# Patient Record
Sex: MALE | Race: WHITE | Employment: OTHER | URBAN - METROPOLITAN AREA
[De-identification: names, ages, dates, MRNs, and addresses within clinical notes are randomized per-mention and may not be internally consistent; named-entity substitution may affect disease eponyms.]

---

## 2023-10-27 PROCEDURE — 99285 EMERGENCY DEPT VISIT HI MDM: CPT

## 2023-10-28 ENCOUNTER — HOSPITAL ENCOUNTER (EMERGENCY)
Facility: HOSPITAL | Age: 68
Discharge: HOME | End: 2023-10-28
Payer: MEDICARE

## 2023-10-28 ENCOUNTER — APPOINTMENT (OUTPATIENT)
Facility: HOSPITAL | Age: 68
End: 2023-10-28
Payer: MEDICARE

## 2023-10-28 VITALS
HEART RATE: 88 BPM | WEIGHT: 111.77 LBS | OXYGEN SATURATION: 100 % | HEIGHT: 67 IN | RESPIRATION RATE: 17 BRPM | DIASTOLIC BLOOD PRESSURE: 94 MMHG | TEMPERATURE: 98.6 F | BODY MASS INDEX: 17.54 KG/M2 | SYSTOLIC BLOOD PRESSURE: 141 MMHG

## 2023-10-28 DIAGNOSIS — R07.89 ATYPICAL CHEST PAIN: Primary | ICD-10-CM

## 2023-10-28 LAB
ALBUMIN SERPL-MCNC: 2.9 G/DL (ref 3.5–5)
ALBUMIN/GLOB SERPL: 0.5 (ref 1.1–2.2)
ALP SERPL-CCNC: 120 U/L (ref 45–117)
ALT SERPL-CCNC: 25 U/L (ref 12–78)
ANION GAP SERPL CALC-SCNC: 6 MMOL/L (ref 5–15)
AST SERPL-CCNC: 19 U/L (ref 15–37)
BASOPHILS # BLD: 0.1 K/UL (ref 0–0.1)
BASOPHILS NFR BLD: 1 % (ref 0–1)
BILIRUB SERPL-MCNC: 0.5 MG/DL (ref 0.2–1)
BUN SERPL-MCNC: 18 MG/DL (ref 6–20)
BUN/CREAT SERPL: 26 (ref 12–20)
CALCIUM SERPL-MCNC: 8.7 MG/DL (ref 8.5–10.1)
CHLORIDE SERPL-SCNC: 103 MMOL/L (ref 97–108)
CO2 SERPL-SCNC: 28 MMOL/L (ref 21–32)
CREAT SERPL-MCNC: 0.68 MG/DL (ref 0.7–1.3)
DIFFERENTIAL METHOD BLD: ABNORMAL
EKG ATRIAL RATE: 78 BPM
EKG DIAGNOSIS: NORMAL
EKG P AXIS: 46 DEGREES
EKG P-R INTERVAL: 140 MS
EKG Q-T INTERVAL: 378 MS
EKG QRS DURATION: 86 MS
EKG QTC CALCULATION (BAZETT): 430 MS
EKG R AXIS: 10 DEGREES
EKG T AXIS: 61 DEGREES
EKG VENTRICULAR RATE: 78 BPM
EOSINOPHIL # BLD: 0.5 K/UL (ref 0–0.4)
EOSINOPHIL NFR BLD: 7 % (ref 0–7)
ERYTHROCYTE [DISTWIDTH] IN BLOOD BY AUTOMATED COUNT: 15.9 % (ref 11.5–14.5)
GLOBULIN SER CALC-MCNC: 5.3 G/DL (ref 2–4)
GLUCOSE SERPL-MCNC: 89 MG/DL (ref 65–100)
HCT VFR BLD AUTO: 32.4 % (ref 36.6–50.3)
HGB BLD-MCNC: 10.7 G/DL (ref 12.1–17)
IMM GRANULOCYTES # BLD AUTO: 0.1 K/UL (ref 0–0.04)
IMM GRANULOCYTES NFR BLD AUTO: 1 % (ref 0–0.5)
LYMPHOCYTES # BLD: 2.2 K/UL (ref 0.8–3.5)
LYMPHOCYTES NFR BLD: 32 % (ref 12–49)
MCH RBC QN AUTO: 26.7 PG (ref 26–34)
MCHC RBC AUTO-ENTMCNC: 33 G/DL (ref 30–36.5)
MCV RBC AUTO: 80.8 FL (ref 80–99)
MONOCYTES # BLD: 0.8 K/UL (ref 0–1)
MONOCYTES NFR BLD: 12 % (ref 5–13)
NEUTS SEG # BLD: 3.2 K/UL (ref 1.8–8)
NEUTS SEG NFR BLD: 47 % (ref 32–75)
NRBC # BLD: 0 K/UL (ref 0–0.01)
NRBC BLD-RTO: 0 PER 100 WBC
PLATELET # BLD AUTO: 215 K/UL (ref 150–400)
PMV BLD AUTO: 10.3 FL (ref 8.9–12.9)
POTASSIUM SERPL-SCNC: 4.2 MMOL/L (ref 3.5–5.1)
PROT SERPL-MCNC: 8.2 G/DL (ref 6.4–8.2)
RBC # BLD AUTO: 4.01 M/UL (ref 4.1–5.7)
SODIUM SERPL-SCNC: 137 MMOL/L (ref 136–145)
TROPONIN I SERPL HS-MCNC: 7 NG/L (ref 0–76)
TROPONIN I SERPL HS-MCNC: 7 NG/L (ref 0–76)
WBC # BLD AUTO: 6.9 K/UL (ref 4.1–11.1)

## 2023-10-28 PROCEDURE — 80053 COMPREHEN METABOLIC PANEL: CPT

## 2023-10-28 PROCEDURE — 84484 ASSAY OF TROPONIN QUANT: CPT

## 2023-10-28 PROCEDURE — 85025 COMPLETE CBC W/AUTO DIFF WBC: CPT

## 2023-10-28 PROCEDURE — 36415 COLL VENOUS BLD VENIPUNCTURE: CPT

## 2023-10-28 PROCEDURE — 71045 X-RAY EXAM CHEST 1 VIEW: CPT

## 2023-10-28 PROCEDURE — 93005 ELECTROCARDIOGRAM TRACING: CPT

## 2023-10-28 ASSESSMENT — PATIENT HEALTH QUESTIONNAIRE - PHQ9
SUM OF ALL RESPONSES TO PHQ QUESTIONS 1-9: 0
SUM OF ALL RESPONSES TO PHQ9 QUESTIONS 1 & 2: 0
SUM OF ALL RESPONSES TO PHQ QUESTIONS 1-9: 0
SUM OF ALL RESPONSES TO PHQ QUESTIONS 1-9: 0
2. FEELING DOWN, DEPRESSED OR HOPELESS: 0
SUM OF ALL RESPONSES TO PHQ QUESTIONS 1-9: 0
1. LITTLE INTEREST OR PLEASURE IN DOING THINGS: 0

## 2023-10-28 ASSESSMENT — PAIN - FUNCTIONAL ASSESSMENT
PAIN_FUNCTIONAL_ASSESSMENT: NONE - DENIES PAIN
PAIN_FUNCTIONAL_ASSESSMENT: NONE - DENIES PAIN

## 2023-10-28 ASSESSMENT — LIFESTYLE VARIABLES
HOW MANY STANDARD DRINKS CONTAINING ALCOHOL DO YOU HAVE ON A TYPICAL DAY: 1 OR 2
HOW OFTEN DO YOU HAVE A DRINK CONTAINING ALCOHOL: MONTHLY OR LESS

## 2023-10-28 ASSESSMENT — HEART SCORE: ECG: 0

## 2023-10-28 NOTE — ED NOTES
Called [facility] and gave return to facility report. Advised on ETA of transport. All facility questions answered.       Mee Fermin RN  10/28/23 4199

## 2023-10-28 NOTE — ED PROVIDER NOTES
64-year-old -American male lying in bed in no acute distress. Patient appears older than stated age. HENT:      Head: Normocephalic and atraumatic. Cardiovascular:      Rate and Rhythm: Normal rate and regular rhythm. Pulses: Normal pulses. Heart sounds: Normal heart sounds. Pulmonary:      Effort: Pulmonary effort is normal. No respiratory distress. Breath sounds: Normal breath sounds. No wheezing. Abdominal:      General: Abdomen is flat. Palpations: Abdomen is soft. Tenderness: There is no abdominal tenderness. Musculoskeletal:      Right Lower Extremity: Right leg is amputated below knee. Skin:     General: Skin is warm and dry. Neurological:      General: No focal deficit present. Mental Status: He is alert and oriented to person, place, and time.    Psychiatric:         Mood and Affect: Mood normal.         Behavior: Behavior normal.          DIAGNOSTIC RESULTS   LABS:     Recent Results (from the past 12 hour(s))   CBC with Auto Differential    Collection Time: 10/28/23  1:00 AM   Result Value Ref Range    WBC 6.9 4.1 - 11.1 K/uL    RBC 4.01 (L) 4.10 - 5.70 M/uL    Hemoglobin 10.7 (L) 12.1 - 17.0 g/dL    Hematocrit 32.4 (L) 36.6 - 50.3 %    MCV 80.8 80.0 - 99.0 FL    MCH 26.7 26.0 - 34.0 PG    MCHC 33.0 30.0 - 36.5 g/dL    RDW 15.9 (H) 11.5 - 14.5 %    Platelets 477 833 - 280 K/uL    MPV 10.3 8.9 - 12.9 FL    Nucleated RBCs 0.0 0  WBC    nRBC 0.00 0.00 - 0.01 K/uL    Neutrophils % 47 32 - 75 %    Lymphocytes % 32 12 - 49 %    Monocytes % 12 5 - 13 %    Eosinophils % 7 0 - 7 %    Basophils % 1 0 - 1 %    Immature Granulocytes 1 (H) 0.0 - 0.5 %    Neutrophils Absolute 3.2 1.8 - 8.0 K/UL    Lymphocytes Absolute 2.2 0.8 - 3.5 K/UL    Monocytes Absolute 0.8 0.0 - 1.0 K/UL    Eosinophils Absolute 0.5 (H) 0.0 - 0.4 K/UL    Basophils Absolute 0.1 0.0 - 0.1 K/UL    Absolute Immature Granulocyte 0.1 (H) 0.00 - 0.04 K/UL    Differential Type AUTOMATED

## 2023-11-01 ENCOUNTER — HOSPITAL ENCOUNTER (INPATIENT)
Facility: HOSPITAL | Age: 68
LOS: 6 days | Discharge: NURSING, REHAB OR LONG-TERM CARE | DRG: 391 | End: 2023-11-07
Attending: EMERGENCY MEDICINE | Admitting: STUDENT IN AN ORGANIZED HEALTH CARE EDUCATION/TRAINING PROGRAM
Payer: MEDICARE

## 2023-11-01 DIAGNOSIS — R13.19 ESOPHAGEAL DYSPHAGIA: Primary | ICD-10-CM

## 2023-11-01 PROBLEM — R13.10 DYSPHAGIA: Status: ACTIVE | Noted: 2023-11-01

## 2023-11-01 LAB
ALBUMIN SERPL-MCNC: 3.5 G/DL (ref 3.5–5)
ALBUMIN/GLOB SERPL: 0.5 (ref 1.1–2.2)
ALP SERPL-CCNC: 143 U/L (ref 45–117)
ALT SERPL-CCNC: 27 U/L (ref 12–78)
AMORPH CRY URNS QL MICRO: ABNORMAL
ANION GAP SERPL CALC-SCNC: 8 MMOL/L (ref 5–15)
APPEARANCE UR: ABNORMAL
AST SERPL-CCNC: 19 U/L (ref 15–37)
BACTERIA URNS QL MICRO: ABNORMAL /HPF
BASOPHILS # BLD: 0.1 K/UL (ref 0–0.1)
BASOPHILS NFR BLD: 1 % (ref 0–1)
BILIRUB SERPL-MCNC: 0.5 MG/DL (ref 0.2–1)
BILIRUB UR QL: NEGATIVE
BUN SERPL-MCNC: 20 MG/DL (ref 6–20)
BUN/CREAT SERPL: 24 (ref 12–20)
CALCIUM SERPL-MCNC: 9.9 MG/DL (ref 8.5–10.1)
CAOX CRY URNS QL MICRO: ABNORMAL
CHLORIDE SERPL-SCNC: 110 MMOL/L (ref 97–108)
CO2 SERPL-SCNC: 26 MMOL/L (ref 21–32)
COLOR UR: ABNORMAL
CREAT SERPL-MCNC: 0.85 MG/DL (ref 0.7–1.3)
DIFFERENTIAL METHOD BLD: ABNORMAL
EOSINOPHIL # BLD: 0.2 K/UL (ref 0–0.4)
EOSINOPHIL NFR BLD: 3 % (ref 0–7)
EPITH CASTS URNS QL MICRO: ABNORMAL /LPF
ERYTHROCYTE [DISTWIDTH] IN BLOOD BY AUTOMATED COUNT: 16 % (ref 11.5–14.5)
GLOBULIN SER CALC-MCNC: 7.1 G/DL (ref 2–4)
GLUCOSE SERPL-MCNC: 73 MG/DL (ref 65–100)
GLUCOSE UR STRIP.AUTO-MCNC: NEGATIVE MG/DL
HCT VFR BLD AUTO: 40.7 % (ref 36.6–50.3)
HGB BLD-MCNC: 13.1 G/DL (ref 12.1–17)
HGB UR QL STRIP: ABNORMAL
IMM GRANULOCYTES # BLD AUTO: 0 K/UL (ref 0–0.04)
IMM GRANULOCYTES NFR BLD AUTO: 0 % (ref 0–0.5)
KETONES UR QL STRIP.AUTO: 15 MG/DL
LEUKOCYTE ESTERASE UR QL STRIP.AUTO: ABNORMAL
LIPASE SERPL-CCNC: 18 U/L (ref 13–75)
LYMPHOCYTES # BLD: 1.1 K/UL (ref 0.8–3.5)
LYMPHOCYTES NFR BLD: 19 % (ref 12–49)
MCH RBC QN AUTO: 26.8 PG (ref 26–34)
MCHC RBC AUTO-ENTMCNC: 32.2 G/DL (ref 30–36.5)
MCV RBC AUTO: 83.2 FL (ref 80–99)
MONOCYTES # BLD: 0.5 K/UL (ref 0–1)
MONOCYTES NFR BLD: 8 % (ref 5–13)
NEUTS SEG # BLD: 4.2 K/UL (ref 1.8–8)
NEUTS SEG NFR BLD: 69 % (ref 32–75)
NITRITE UR QL STRIP.AUTO: NEGATIVE
NRBC # BLD: 0 K/UL (ref 0–0.01)
NRBC BLD-RTO: 0 PER 100 WBC
PH UR STRIP: 7.5 (ref 5–8)
PLATELET # BLD AUTO: 297 K/UL (ref 150–400)
PMV BLD AUTO: 11 FL (ref 8.9–12.9)
POTASSIUM SERPL-SCNC: 4 MMOL/L (ref 3.5–5.1)
PROT SERPL-MCNC: 10.6 G/DL (ref 6.4–8.2)
PROT UR STRIP-MCNC: 100 MG/DL
RBC # BLD AUTO: 4.89 M/UL (ref 4.1–5.7)
RBC #/AREA URNS HPF: ABNORMAL /HPF (ref 0–5)
SODIUM SERPL-SCNC: 144 MMOL/L (ref 136–145)
SP GR UR REFRACTOMETRY: 1.01
URINE CULTURE IF INDICATED: ABNORMAL
UROBILINOGEN UR QL STRIP.AUTO: 1 EU/DL (ref 0.2–1)
WBC # BLD AUTO: 6.1 K/UL (ref 4.1–11.1)
WBC URNS QL MICRO: ABNORMAL /HPF (ref 0–4)

## 2023-11-01 PROCEDURE — 87077 CULTURE AEROBIC IDENTIFY: CPT

## 2023-11-01 PROCEDURE — 1100000000 HC RM PRIVATE

## 2023-11-01 PROCEDURE — 2580000003 HC RX 258: Performed by: EMERGENCY MEDICINE

## 2023-11-01 PROCEDURE — 80053 COMPREHEN METABOLIC PANEL: CPT

## 2023-11-01 PROCEDURE — 36415 COLL VENOUS BLD VENIPUNCTURE: CPT

## 2023-11-01 PROCEDURE — 87086 URINE CULTURE/COLONY COUNT: CPT

## 2023-11-01 PROCEDURE — 99285 EMERGENCY DEPT VISIT HI MDM: CPT

## 2023-11-01 PROCEDURE — 6360000002 HC RX W HCPCS: Performed by: EMERGENCY MEDICINE

## 2023-11-01 PROCEDURE — 85025 COMPLETE CBC W/AUTO DIFF WBC: CPT

## 2023-11-01 PROCEDURE — 87186 SC STD MICRODIL/AGAR DIL: CPT

## 2023-11-01 PROCEDURE — 93005 ELECTROCARDIOGRAM TRACING: CPT | Performed by: STUDENT IN AN ORGANIZED HEALTH CARE EDUCATION/TRAINING PROGRAM

## 2023-11-01 PROCEDURE — 6370000000 HC RX 637 (ALT 250 FOR IP): Performed by: EMERGENCY MEDICINE

## 2023-11-01 PROCEDURE — 2580000003 HC RX 258: Performed by: NURSE PRACTITIONER

## 2023-11-01 PROCEDURE — 81001 URINALYSIS AUTO W/SCOPE: CPT

## 2023-11-01 PROCEDURE — 96374 THER/PROPH/DIAG INJ IV PUSH: CPT

## 2023-11-01 PROCEDURE — 83690 ASSAY OF LIPASE: CPT

## 2023-11-01 RX ORDER — ACETAMINOPHEN 650 MG/1
650 SUPPOSITORY RECTAL EVERY 6 HOURS PRN
Status: DISCONTINUED | OUTPATIENT
Start: 2023-11-01 | End: 2023-11-07 | Stop reason: HOSPADM

## 2023-11-01 RX ORDER — ONDANSETRON 4 MG/1
4 TABLET, ORALLY DISINTEGRATING ORAL EVERY 8 HOURS PRN
Status: DISCONTINUED | OUTPATIENT
Start: 2023-11-01 | End: 2023-11-07 | Stop reason: HOSPADM

## 2023-11-01 RX ORDER — ONDANSETRON 2 MG/ML
4 INJECTION INTRAMUSCULAR; INTRAVENOUS EVERY 6 HOURS PRN
Status: DISCONTINUED | OUTPATIENT
Start: 2023-11-01 | End: 2023-11-07 | Stop reason: HOSPADM

## 2023-11-01 RX ORDER — POTASSIUM CHLORIDE 7.45 MG/ML
10 INJECTION INTRAVENOUS PRN
Status: DISCONTINUED | OUTPATIENT
Start: 2023-11-01 | End: 2023-11-07 | Stop reason: HOSPADM

## 2023-11-01 RX ORDER — SODIUM CHLORIDE 0.9 % (FLUSH) 0.9 %
5-40 SYRINGE (ML) INJECTION PRN
Status: DISCONTINUED | OUTPATIENT
Start: 2023-11-01 | End: 2023-11-07 | Stop reason: HOSPADM

## 2023-11-01 RX ORDER — POTASSIUM CHLORIDE 20 MEQ/1
40 TABLET, EXTENDED RELEASE ORAL PRN
Status: DISCONTINUED | OUTPATIENT
Start: 2023-11-01 | End: 2023-11-07 | Stop reason: HOSPADM

## 2023-11-01 RX ORDER — POLYETHYLENE GLYCOL 3350 17 G/17G
17 POWDER, FOR SOLUTION ORAL DAILY PRN
Status: DISCONTINUED | OUTPATIENT
Start: 2023-11-01 | End: 2023-11-02

## 2023-11-01 RX ORDER — SODIUM CHLORIDE 0.9 % (FLUSH) 0.9 %
5-40 SYRINGE (ML) INJECTION EVERY 12 HOURS SCHEDULED
Status: DISCONTINUED | OUTPATIENT
Start: 2023-11-01 | End: 2023-11-07 | Stop reason: HOSPADM

## 2023-11-01 RX ORDER — SODIUM CHLORIDE, SODIUM LACTATE, POTASSIUM CHLORIDE, AND CALCIUM CHLORIDE .6; .31; .03; .02 G/100ML; G/100ML; G/100ML; G/100ML
1000 INJECTION, SOLUTION INTRAVENOUS
Status: COMPLETED | OUTPATIENT
Start: 2023-11-01 | End: 2023-11-01

## 2023-11-01 RX ORDER — SODIUM CHLORIDE 9 MG/ML
INJECTION, SOLUTION INTRAVENOUS PRN
Status: DISCONTINUED | OUTPATIENT
Start: 2023-11-01 | End: 2023-11-07 | Stop reason: HOSPADM

## 2023-11-01 RX ORDER — SODIUM CHLORIDE 9 MG/ML
INJECTION, SOLUTION INTRAVENOUS CONTINUOUS
Status: DISCONTINUED | OUTPATIENT
Start: 2023-11-01 | End: 2023-11-02

## 2023-11-01 RX ORDER — MAGNESIUM SULFATE IN WATER 40 MG/ML
2000 INJECTION, SOLUTION INTRAVENOUS PRN
Status: DISCONTINUED | OUTPATIENT
Start: 2023-11-01 | End: 2023-11-07 | Stop reason: HOSPADM

## 2023-11-01 RX ORDER — ACETAMINOPHEN 325 MG/1
650 TABLET ORAL EVERY 6 HOURS PRN
Status: DISCONTINUED | OUTPATIENT
Start: 2023-11-01 | End: 2023-11-07 | Stop reason: HOSPADM

## 2023-11-01 RX ORDER — ENOXAPARIN SODIUM 100 MG/ML
30 INJECTION SUBCUTANEOUS DAILY
Status: DISCONTINUED | OUTPATIENT
Start: 2023-11-02 | End: 2023-11-07 | Stop reason: HOSPADM

## 2023-11-01 RX ADMIN — SODIUM CHLORIDE, POTASSIUM CHLORIDE, SODIUM LACTATE AND CALCIUM CHLORIDE 1000 ML: 600; 310; 30; 20 INJECTION, SOLUTION INTRAVENOUS at 20:52

## 2023-11-01 RX ADMIN — GLUCAGON 2 MG: KIT at 20:24

## 2023-11-01 RX ADMIN — ANTACID/ANTIFLATULENT 1 EACH: 380; 550; 10; 10 GRANULE, EFFERVESCENT ORAL at 20:12

## 2023-11-01 RX ADMIN — SODIUM CHLORIDE: 9 INJECTION, SOLUTION INTRAVENOUS at 22:25

## 2023-11-01 ASSESSMENT — LIFESTYLE VARIABLES
HOW MANY STANDARD DRINKS CONTAINING ALCOHOL DO YOU HAVE ON A TYPICAL DAY: PATIENT DOES NOT DRINK
HOW OFTEN DO YOU HAVE A DRINK CONTAINING ALCOHOL: NEVER

## 2023-11-01 ASSESSMENT — ENCOUNTER SYMPTOMS
GASTROINTESTINAL NEGATIVE: 1
RESPIRATORY NEGATIVE: 1
ALLERGIC/IMMUNOLOGIC NEGATIVE: 1
EYES NEGATIVE: 1

## 2023-11-01 ASSESSMENT — PAIN - FUNCTIONAL ASSESSMENT: PAIN_FUNCTIONAL_ASSESSMENT: NONE - DENIES PAIN

## 2023-11-01 NOTE — ED PROVIDER NOTES
[facility] EMERGENCY DEPT  EMERGENCY DEPARTMENT ENCOUNTER       Pt Name: Eduardo Pena  MRN: [id number]  [address] [facility] 1955  Date of evaluation: 11/1/2023  Provider: Soas Davis MD   PCP: No primary care provider on file. Note Started: 7:17 PM EDT 11/1/23     CHIEF COMPLAINT       Chief Complaint   Patient presents with    Abdominal Pain        HISTORY OF PRESENT ILLNESS: 1 or more elements      History From: patient, History limited by: none     Eduardo Pena is a 77 y.o. male with a history of a right leg BKA, urostomy who presents to the emergency department with a chief complaint of \"spitting up. \"    Patient reports that over the past 24 hours he has noted that he will drink water and eat things that will \"get caught in his chest.\"  He reports that he will then spit these out. He denies any nausea. Denies chest pain or shortness of breath. Denies abdominal pain. Denies diarrhea. Patient last attempted to drink a \"cold pepsi\" and reports he felt it get stuck in his chest and then \"come back up. \"    Please See MDM for Additional Details of the HPI/PMH  Nursing Notes were all reviewed and agreed with or any disagreements were addressed in the HPI. REVIEW OF SYSTEMS        Positives and Pertinent negatives as per HPI. PAST HISTORY     Past Medical History:  No past medical history on file. Past Surgical History:  No past surgical history on file. Family History:  No family history on file. Social History: Allergies: Allergies   Allergen Reactions    Iodinated Contrast Media Shortness Of Breath and Myalgia     received contrast dye for CT IVP in October 2017 at OSH- Mild shortness of breath not requiring oxygen without any concern for airway, no rashes. Reported vague back spasms that quickly resolved.  Please see care everywhere for more information    Amlodipine Swelling     Seems to have contributed to significant LE edema (improved in 2/2019 after stopping Amlodipine)    Contrast

## 2023-11-01 NOTE — ED TRIAGE NOTES
[facility] [place] [facility] 1 day. Received zofran by facility.  Pt vomited in route to hospital.

## 2023-11-02 ENCOUNTER — ANESTHESIA (OUTPATIENT)
Facility: HOSPITAL | Age: 68
End: 2023-11-02
Payer: MEDICARE

## 2023-11-02 ENCOUNTER — APPOINTMENT (OUTPATIENT)
Facility: HOSPITAL | Age: 68
DRG: 391 | End: 2023-11-02
Payer: MEDICARE

## 2023-11-02 ENCOUNTER — ANESTHESIA EVENT (OUTPATIENT)
Facility: HOSPITAL | Age: 68
End: 2023-11-02
Payer: MEDICARE

## 2023-11-02 LAB
ANION GAP SERPL CALC-SCNC: 9 MMOL/L (ref 5–15)
BASOPHILS # BLD: 0.1 K/UL (ref 0–0.1)
BASOPHILS NFR BLD: 1 % (ref 0–1)
BUN SERPL-MCNC: 26 MG/DL (ref 6–20)
BUN/CREAT SERPL: 33 (ref 12–20)
CALCIUM SERPL-MCNC: 9.6 MG/DL (ref 8.5–10.1)
CHLORIDE SERPL-SCNC: 114 MMOL/L (ref 97–108)
CO2 SERPL-SCNC: 24 MMOL/L (ref 21–32)
CREAT SERPL-MCNC: 0.8 MG/DL (ref 0.7–1.3)
DIFFERENTIAL METHOD BLD: ABNORMAL
EOSINOPHIL # BLD: 0.4 K/UL (ref 0–0.4)
EOSINOPHIL NFR BLD: 5 % (ref 0–7)
ERYTHROCYTE [DISTWIDTH] IN BLOOD BY AUTOMATED COUNT: 16.1 % (ref 11.5–14.5)
GLUCOSE SERPL-MCNC: 72 MG/DL (ref 65–100)
HCT VFR BLD AUTO: 35.5 % (ref 36.6–50.3)
HGB BLD-MCNC: 11.3 G/DL (ref 12.1–17)
IMM GRANULOCYTES # BLD AUTO: 0.1 K/UL (ref 0–0.04)
IMM GRANULOCYTES NFR BLD AUTO: 1 % (ref 0–0.5)
LYMPHOCYTES # BLD: 1.5 K/UL (ref 0.8–3.5)
LYMPHOCYTES NFR BLD: 19 % (ref 12–49)
MCH RBC QN AUTO: 26.7 PG (ref 26–34)
MCHC RBC AUTO-ENTMCNC: 31.8 G/DL (ref 30–36.5)
MCV RBC AUTO: 83.7 FL (ref 80–99)
MONOCYTES # BLD: 0.7 K/UL (ref 0–1)
MONOCYTES NFR BLD: 9 % (ref 5–13)
NEUTS SEG # BLD: 5.2 K/UL (ref 1.8–8)
NEUTS SEG NFR BLD: 65 % (ref 32–75)
NRBC # BLD: 0 K/UL (ref 0–0.01)
NRBC BLD-RTO: 0 PER 100 WBC
PLATELET # BLD AUTO: 243 K/UL (ref 150–400)
PMV BLD AUTO: 10.8 FL (ref 8.9–12.9)
POTASSIUM SERPL-SCNC: 3.9 MMOL/L (ref 3.5–5.1)
RBC # BLD AUTO: 4.24 M/UL (ref 4.1–5.7)
SODIUM SERPL-SCNC: 147 MMOL/L (ref 136–145)
WBC # BLD AUTO: 8 K/UL (ref 4.1–11.1)

## 2023-11-02 PROCEDURE — 7100000010 HC PHASE II RECOVERY - FIRST 15 MIN: Performed by: INTERNAL MEDICINE

## 2023-11-02 PROCEDURE — 2709999900 HC NON-CHARGEABLE SUPPLY: Performed by: INTERNAL MEDICINE

## 2023-11-02 PROCEDURE — 2580000003 HC RX 258: Performed by: INTERNAL MEDICINE

## 2023-11-02 PROCEDURE — 71045 X-RAY EXAM CHEST 1 VIEW: CPT

## 2023-11-02 PROCEDURE — A4216 STERILE WATER/SALINE, 10 ML: HCPCS | Performed by: INTERNAL MEDICINE

## 2023-11-02 PROCEDURE — 2580000003 HC RX 258: Performed by: STUDENT IN AN ORGANIZED HEALTH CARE EDUCATION/TRAINING PROGRAM

## 2023-11-02 PROCEDURE — C9113 INJ PANTOPRAZOLE SODIUM, VIA: HCPCS | Performed by: INTERNAL MEDICINE

## 2023-11-02 PROCEDURE — C1726 CATH, BAL DIL, NON-VASCULAR: HCPCS | Performed by: INTERNAL MEDICINE

## 2023-11-02 PROCEDURE — 6360000002 HC RX W HCPCS

## 2023-11-02 PROCEDURE — 85025 COMPLETE CBC W/AUTO DIFF WBC: CPT

## 2023-11-02 PROCEDURE — 2500000003 HC RX 250 WO HCPCS

## 2023-11-02 PROCEDURE — 0DB58ZX EXCISION OF ESOPHAGUS, VIA NATURAL OR ARTIFICIAL OPENING ENDOSCOPIC, DIAGNOSTIC: ICD-10-PCS | Performed by: INTERNAL MEDICINE

## 2023-11-02 PROCEDURE — 6360000002 HC RX W HCPCS: Performed by: INTERNAL MEDICINE

## 2023-11-02 PROCEDURE — 0DB68ZX EXCISION OF STOMACH, VIA NATURAL OR ARTIFICIAL OPENING ENDOSCOPIC, DIAGNOSTIC: ICD-10-PCS | Performed by: INTERNAL MEDICINE

## 2023-11-02 PROCEDURE — 88342 IMHCHEM/IMCYTCHM 1ST ANTB: CPT

## 2023-11-02 PROCEDURE — 88305 TISSUE EXAM BY PATHOLOGIST: CPT

## 2023-11-02 PROCEDURE — 3600007502: Performed by: INTERNAL MEDICINE

## 2023-11-02 PROCEDURE — 2580000003 HC RX 258: Performed by: NURSE PRACTITIONER

## 2023-11-02 PROCEDURE — 3700000000 HC ANESTHESIA ATTENDED CARE: Performed by: INTERNAL MEDICINE

## 2023-11-02 PROCEDURE — 3600007512: Performed by: INTERNAL MEDICINE

## 2023-11-02 PROCEDURE — 6360000002 HC RX W HCPCS: Performed by: NURSE PRACTITIONER

## 2023-11-02 PROCEDURE — 0D748ZZ DILATION OF ESOPHAGOGASTRIC JUNCTION, VIA NATURAL OR ARTIFICIAL OPENING ENDOSCOPIC: ICD-10-PCS | Performed by: INTERNAL MEDICINE

## 2023-11-02 PROCEDURE — 6370000000 HC RX 637 (ALT 250 FOR IP): Performed by: STUDENT IN AN ORGANIZED HEALTH CARE EDUCATION/TRAINING PROGRAM

## 2023-11-02 PROCEDURE — 1100000003 HC PRIVATE W/ TELEMETRY

## 2023-11-02 PROCEDURE — 3700000001 HC ADD 15 MINUTES (ANESTHESIA): Performed by: INTERNAL MEDICINE

## 2023-11-02 PROCEDURE — 36415 COLL VENOUS BLD VENIPUNCTURE: CPT

## 2023-11-02 PROCEDURE — 80048 BASIC METABOLIC PNL TOTAL CA: CPT

## 2023-11-02 RX ORDER — SENNOSIDES A AND B 8.6 MG/1
2 TABLET, FILM COATED ORAL 2 TIMES DAILY
Status: DISCONTINUED | OUTPATIENT
Start: 2023-11-02 | End: 2023-11-07 | Stop reason: HOSPADM

## 2023-11-02 RX ORDER — PHENYLEPHRINE HCL IN 0.9% NACL 0.4MG/10ML
SYRINGE (ML) INTRAVENOUS PRN
Status: DISCONTINUED | OUTPATIENT
Start: 2023-11-02 | End: 2023-11-02 | Stop reason: SDUPTHER

## 2023-11-02 RX ORDER — SODIUM CHLORIDE 0.9 % (FLUSH) 0.9 %
5-40 SYRINGE (ML) INJECTION EVERY 12 HOURS SCHEDULED
Status: DISCONTINUED | OUTPATIENT
Start: 2023-11-02 | End: 2023-11-02 | Stop reason: HOSPADM

## 2023-11-02 RX ORDER — LIDOCAINE HYDROCHLORIDE 20 MG/ML
INJECTION, SOLUTION EPIDURAL; INFILTRATION; INTRACAUDAL; PERINEURAL PRN
Status: DISCONTINUED | OUTPATIENT
Start: 2023-11-02 | End: 2023-11-02 | Stop reason: SDUPTHER

## 2023-11-02 RX ORDER — POLYETHYLENE GLYCOL 3350 17 G/17G
17 POWDER, FOR SOLUTION ORAL 2 TIMES DAILY
Status: DISCONTINUED | OUTPATIENT
Start: 2023-11-02 | End: 2023-11-07 | Stop reason: HOSPADM

## 2023-11-02 RX ORDER — SODIUM CHLORIDE 0.9 % (FLUSH) 0.9 %
5-40 SYRINGE (ML) INJECTION PRN
Status: DISCONTINUED | OUTPATIENT
Start: 2023-11-02 | End: 2023-11-02 | Stop reason: HOSPADM

## 2023-11-02 RX ORDER — SODIUM CHLORIDE 9 MG/ML
25 INJECTION, SOLUTION INTRAVENOUS PRN
Status: DISCONTINUED | OUTPATIENT
Start: 2023-11-02 | End: 2023-11-07 | Stop reason: HOSPADM

## 2023-11-02 RX ORDER — SODIUM CHLORIDE, SODIUM LACTATE, POTASSIUM CHLORIDE, CALCIUM CHLORIDE 600; 310; 30; 20 MG/100ML; MG/100ML; MG/100ML; MG/100ML
INJECTION, SOLUTION INTRAVENOUS CONTINUOUS
Status: DISCONTINUED | OUTPATIENT
Start: 2023-11-02 | End: 2023-11-03

## 2023-11-02 RX ADMIN — SODIUM CHLORIDE, PRESERVATIVE FREE 40 MG: 5 INJECTION INTRAVENOUS at 18:20

## 2023-11-02 RX ADMIN — Medication 80 MCG: at 15:18

## 2023-11-02 RX ADMIN — SODIUM CHLORIDE, PRESERVATIVE FREE 5 ML: 5 INJECTION INTRAVENOUS at 10:05

## 2023-11-02 RX ADMIN — STANDARDIZED SENNA CONCENTRATE 17.2 MG: 8.6 TABLET ORAL at 21:43

## 2023-11-02 RX ADMIN — SODIUM CHLORIDE, POTASSIUM CHLORIDE, SODIUM LACTATE AND CALCIUM CHLORIDE: 600; 310; 30; 20 INJECTION, SOLUTION INTRAVENOUS at 09:12

## 2023-11-02 RX ADMIN — SODIUM CHLORIDE 1000 MG: 900 INJECTION INTRAVENOUS at 06:00

## 2023-11-02 RX ADMIN — SODIUM CHLORIDE, PRESERVATIVE FREE 10 ML: 5 INJECTION INTRAVENOUS at 21:53

## 2023-11-02 RX ADMIN — LIDOCAINE HYDROCHLORIDE 60 MG: 20 INJECTION, SOLUTION EPIDURAL; INFILTRATION; INTRACAUDAL; PERINEURAL at 15:10

## 2023-11-02 RX ADMIN — SODIUM CHLORIDE, POTASSIUM CHLORIDE, SODIUM LACTATE AND CALCIUM CHLORIDE: 600; 310; 30; 20 INJECTION, SOLUTION INTRAVENOUS at 21:52

## 2023-11-02 RX ADMIN — SODIUM CHLORIDE 25 ML: 9 INJECTION, SOLUTION INTRAVENOUS at 14:21

## 2023-11-02 RX ADMIN — PROPOFOL 180 MG: 10 INJECTION, EMULSION INTRAVENOUS at 15:28

## 2023-11-02 RX ADMIN — POLYETHYLENE GLYCOL 3350 17 G: 17 POWDER, FOR SOLUTION ORAL at 21:43

## 2023-11-02 ASSESSMENT — PAIN - FUNCTIONAL ASSESSMENT: PAIN_FUNCTIONAL_ASSESSMENT: NONE - DENIES PAIN

## 2023-11-02 NOTE — PROGRESS NOTES
CRE balloon dilatation of the esophagus   15 mm Balloon inflated to 3 ATMs and held for 60 seconds. mm Balloon inflated to  ATMs and held for  seconds. mm Balloon inflated to  ATMs and held for  seconds. No subcutaneous crepitus of the chest or cervical region was noted post dilatation.

## 2023-11-02 NOTE — ED NOTES
Pt with healing decubitus to sacrum. Wound clean. No purulent drainage noted, minimal sanguinous drainage noted, edges approximated, wound pink .        Heidy Og RN  11/01/23 5424

## 2023-11-02 NOTE — PROGRESS NOTES
Endoscopy recovery  Patient returned to baseline, vital signs stable (see vital sign flowsheet). Respiratory status within defined limits. Abdomen soft not tender. Skin with in defined limits.

## 2023-11-02 NOTE — PROGRESS NOTES
Endoscopy Case End Note:    Procedure scope was pre-cleaned, per protocol, at bedside by PATRICIA Escalante. Report received from anesthesia. See anesthesia flowsheet for intra-procedure vital signs and events. Belongings returned to patient.

## 2023-11-02 NOTE — WOUND CARE
Wound care consult for the pressure injury that is present on admission. Chart reviewed and patient assessed. Pt. Is just back from endoscopy where he had a test on the upper GI for Dysphagia. 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 Pt. Stated, It has taken a long time to heal. There is pink epithelial skin tissue and 2 small open areas that are pink. No tunneling at this time. Treatment for the pressure injury:  Cleansed with soap and water and then dressed with Therahoney HD sheet (small piece) and covered with a foam dressing. Pt. Needs to stay on the right side lying position or on the left. Heels need to be protected. It was also noted when the diaper was removed - pt. Has a huge stool in the vault of the rectum and it it stretching the anus wide open. Pt. Had to be disimpacted for this to move. Used Lubricant to do this and three passes into the vault to pull a lot of hard stool out. There was no blood and no outside injury noted with this. Treatment Recommended:  Pt. Needs a paralytic bowel regimen started for him. He only has \"PRN\" meds ordered for this. Pt. Kaden Thompson really feel when he has to go or that he is constipated. The goal is to soften and move the stool along in the colon. Perfect serve message sent to Dr. Aly Clark.    Mika Garcia RN, BSN, [employer]

## 2023-11-02 NOTE — PROGRESS NOTES
Patient arrived to endoscopy via stretcher. A&Ox4 and on room air. VSS. Patient reports having chipped front tooth. The risks and benefits of the bite block have been explained to patient. Patient verbalizes understanding. Patient has telebox at bedside.

## 2023-11-02 NOTE — ANESTHESIA POSTPROCEDURE EVALUATION
Department of Anesthesiology  Postprocedure Note    Patient: Fernanda Diaz  MRN: [id number]  [facility] [facility] 1955  Date of evaluation: 11/2/2023      Procedure Summary     Date: 11/02/23 Room / Location: [facility]    Anesthesia Start: 1457 Anesthesia Stop: 7726    Procedure: EGD ESOPHAGOGASTRODUODENOSCOPY Diagnosis:       Dysphagia, unspecified type      (Dysphagia, unspecified type [R13.10])    Surgeons: Elvia Desai MD Responsible Provider: Kathy Bond DO    Anesthesia Type: TIVA ASA Status: 2          Anesthesia Type: TIVA    Rubi Phase I: Rubi Score: 10    Rubi Phase II:        Anesthesia Post Evaluation    Patient location during evaluation: PACU  Patient participation: complete - patient participated  Level of consciousness: awake  Airway patency: patent  Nausea & Vomiting: no vomiting and no nausea  Complications: no  Cardiovascular status: hemodynamically stable  Respiratory status: acceptable  Hydration status: euvolemic

## 2023-11-02 NOTE — OP NOTE
NAME:  Aruna Gann   :   1955   MRN:   [id number]     Date/Time:  2023 4:29 PM    Esophagogastroduodenoscopy (EGD) Procedure Note    Procedure: Esophagogastroduodenoscopy with biopsy, esophageal dilation with balloon    Indication: Dysphagia  Pre-operative Diagnosis: see indication above  Post-operative Diagnosis: see findings below  :  Barrie Lay MD  Referring Provider:   -D. Mendel, MD-No primary care provider on file. Exam:  Airway: clear, no airway problems anticipated  Heart: RRR, without gallops or rubs  Lungs: clear bilaterally without wheezes, crackles, or rhonchi  Abdomen: soft, nontender, nondistended, bowel sounds present  Mental Status: awake, alert and oriented to person, place and time     Anethesia/Sedation:  MAC anesthesia Propofol 180mg IV  Procedure Details   After informed consent was obtained for the procedure, with all risks and benefits of procedure explained the patient was taken to the endoscopy suite and placed in the left lateral decubitus position. Following sequential administration of sedation as per above, the [id number] gastroscope was inserted into the mouth and advanced under direct vision to second portion of the duodenum. A careful inspection was made as the gastroscope was withdrawn, including a retroflexed view of the proximal stomach; findings and interventions are described below.                   Findings:    -Distal thin circumferential esophageal stricture at 35cm disrupted with endoscope and readily traversed; this is biopsied and then dilated with 15mm balloon, held for 1 minute and evaluated noting small nonbleeding mucosal tear  -Distal esophagitis from 36-37cm at level of gastroesophageal junction; biopsied  -Medium-sized 7cm hiatal hernia from 37-44cm  -Normal gastric mucosa; biopsied  -Normal duodenal mucosa    Therapies:  esophageal dilation with 15mm sized balloon (single setting only); biopsy of esophagus; biopsy of stomach   Specimens:

## 2023-11-02 NOTE — CONSULTS
Gastroenterology Attending Physician attestation statement and comments. This patient was seen and examined by me in a face-to-face visit today. I reviewed the medical record including lab work, imaging and other provider notes. I confirmed the history as described above. I spoke to the patient, reviewed the medical record including lab work, imaging and other provider notes. I discussed this case in detail with PABLO Shabazz. I formulated an  assessment of this patient and developed a treatment plan. I agree with the above consultation note. I agree with the history, exam and assessment and plan as outlined in the note. I would like to add the followinyo M w/ c/o dysphagia to solids and some liquids, but managing secretions without difficulty. PE with noted quadriplegia. Labs reviewed. Plan for Egd now to exclude stricture or mass. Filomena Albrecht MD     GI CONSULTATION NOTE  Inna Nick, [place]  [phone] NP in-hospital cell phone M-F until 4:30  After 5pm or on weekends, please call  for physician on call    NAME: Retia Fothergill   :  1955   MRN:  [id number]   Attending: Giorgio Magaña  Primary GI: Jonathan Raygoza  Date/Time:  2023 9:13 AM  Assessment:   Dysphagia  Patient with history significant for quadriplegia and GERD reports two days of solid and liquid dysphagia in chest/epigastrium. No imaging for review. No odynophagia. Managing secretions. No NSAID usage, does endorse occasional tylenol. No blood thinners  Distant history of ETOH abuse, none recently. Tobacco use, last cigarette 2 months ago. Previous EGD  which showed severe candidal esophagitis. Quadriplegia  Plan:   Plan for EGD today with Dr. Jonathan Raygoza  NPO today. Details and risks of the procedure to include (but not limited to) anesthesia, bleeding, infection, and perforation were discussed.  Patient understands and is in agreement with the plan         Plan discussed with Dr. Diaz Harness:     HISTORY OF PRESENT ILLNESS: Nucleated RBCs 0.0 0  WBC    nRBC 0.00 0.00 - 0.01 K/uL    Neutrophils % 69 32 - 75 %    Lymphocytes % 19 12 - 49 %    Monocytes % 8 5 - 13 %    Eosinophils % 3 0 - 7 %    Basophils % 1 0 - 1 %    Immature Granulocytes 0 0.0 - 0.5 %    Neutrophils Absolute 4.2 1.8 - 8.0 K/UL    Lymphocytes Absolute 1.1 0.8 - 3.5 K/UL    Monocytes Absolute 0.5 0.0 - 1.0 K/UL    Eosinophils Absolute 0.2 0.0 - 0.4 K/UL    Basophils Absolute 0.1 0.0 - 0.1 K/UL    Absolute Immature Granulocyte 0.0 0.00 - 0.04 K/UL    Differential Type AUTOMATED     CMP    Collection Time: 11/01/23  7:06 PM   Result Value Ref Range    Sodium 144 136 - 145 mmol/L    Potassium 4.0 3.5 - 5.1 mmol/L    Chloride 110 (H) 97 - 108 mmol/L    CO2 26 21 - 32 mmol/L    Anion Gap 8 5 - 15 mmol/L    Glucose 73 65 - 100 mg/dL    BUN 20 6 - 20 MG/DL    Creatinine 0.85 0.70 - 1.30 MG/DL    Bun/Cre Ratio 24 (H) 12 - 20      Est, Glom Filt Rate >60 >60 ml/min/1.73m2    Calcium 9.9 8.5 - 10.1 MG/DL    Total Bilirubin 0.5 0.2 - 1.0 MG/DL    ALT 27 12 - 78 U/L    AST 19 15 - 37 U/L    Alk Phosphatase 143 (H) 45 - 117 U/L    Total Protein 10.6 (H) 6.4 - 8.2 g/dL    Albumin 3.5 3.5 - 5.0 g/dL    Globulin 7.1 (H) 2.0 - 4.0 g/dL    Albumin/Globulin Ratio 0.5 (L) 1.1 - 2.2     Lipase    Collection Time: 11/01/23  7:06 PM   Result Value Ref Range    Lipase 18 13 - 75 U/L   Urinalysis with Reflex to Culture    Collection Time: 11/01/23  7:36 PM    Specimen: Urine   Result Value Ref Range    Color, UA YELLOW/STRAW      Appearance TURBID (A) CLEAR      Specific Gravity, UA 1.013      pH, Urine 7.5 5.0 - 8.0      Protein,  (A) NEG mg/dL    Glucose, UA Negative NEG mg/dL    Ketones, Urine 15 (A) NEG mg/dL    Bilirubin Urine Negative NEG      Blood, Urine MODERATE (A) NEG      Urobilinogen, Urine 1.0 0.2 - 1.0 EU/dL    Nitrite, Urine Negative NEG      Leukocyte Esterase, Urine LARGE (A) NEG      WBC, UA 10-20 0 - 4 /hpf    RBC, UA 0-5 0 - 5 /hpf    Epithelial Cells UA FEW

## 2023-11-02 NOTE — H&P
Hospitalist Admission Note    NAME: Josesito Belcher   :  1955   MRN:  [id number]     Date/Time:  2023 10:05 PM    Patient PCP: No primary care provider on file. Patient reported Dr. Lore Hicks at the [address] [place] [address] Margarito Urbina was his PCP. Phone number [phone]      ______________________________________________________________________  Given the patient's current clinical presentation in regards to the patient's multiple medical problems, complex decision making was performed, which includes reviewing the patient's available past medical records, laboratory results, and diagnostic studies. My assessment of this patient's clinical condition and my plan of care is as follows.     Assessment / Plan:    Esophageal Dysphagia  Patient with 2-day history of emesis following ingestion of food or liquid without associated nausea or chest pain; patient reported food or liquid feels like it \"gets stuck in his chest\"  EZ Gas II and glucagon tablet not tolerated   Etiology esophageal stricture versus impaction  GI has been consulted (Dr. Mandeep Omalley); plan for EGD in AM  Strict NPO  NS @ 75 ml/hr  UTI  Patient with urostomy; per patient, [facility]rostomy [facility] at [facility] in  due to urinary retention  Urinalysis showed 2+ bacteria, large amount leukocyte esterase  Ceftriaxone started  Urine cultures pending     Sacral Decubitus Wounds  Mostly healed; small areas with skin tearing and with mild bleeding  Wound consultation appreciated  Reposition patient every 1-2 hours       Medical Decision Making:   I personally reviewed labs: CBC, BMP, BG  I personally reviewed imaging: CXR, EKG  Toxic drug monitoring: Daily  Discussed case with: Attending physician Dr. Geraldine Goss Status: FULL  DVT Prophylaxis: SCDs  GI Prophylaxis:      Subjective:   CHIEF COMPLAINT: Vomiting    HISTORY OF PRESENT ILLNESS:     Josesito Belcher is a 77 y.o.  male with PMHx significant for right AKA, and urostomy who presented on Wednesday Normal heart sounds. No murmur heard. No friction rub. Pulmonary:      Effort: Pulmonary effort is normal. No respiratory distress. Chest:      Chest wall: No tenderness. Abdominal:      General: Bowel sounds are normal.   Musculoskeletal:         General: Deformity present. Cervical back: Normal range of motion. Comments: Left hand deformity   Skin:     General: Skin is warm. Capillary Refill: Capillary refill takes 2 to 3 seconds. Comments: Sacral wounds healed; small area with open skin tear covered with dressing  Left knee/left lower extremity with scattered closed, healed wounds   Neurological:      Mental Status: He is alert and oriented to person, place, and time. Psychiatric:         Mood and Affect: Mood normal.         Behavior: Behavior normal.               LAB DATA REVIEWED:    Recent Results (from the past 12 hour(s))   CBC with Diff    Collection Time: 11/01/23  7:06 PM   Result Value Ref Range    WBC 6.1 4.1 - 11.1 K/uL    RBC 4.89 4. 10 - 5.70 M/uL    Hemoglobin 13.1 12.1 - 17.0 g/dL    Hematocrit 40.7 36.6 - 50.3 %    MCV 83.2 80.0 - 99.0 FL    MCH 26.8 26.0 - 34.0 PG    MCHC 32.2 30.0 - 36.5 g/dL    RDW 16.0 (H) 11.5 - 14.5 %    Platelets 765 070 - 977 K/uL    MPV 11.0 8.9 - 12.9 FL    Nucleated RBCs 0.0 0  WBC    nRBC 0.00 0.00 - 0.01 K/uL    Neutrophils % 69 32 - 75 %    Lymphocytes % 19 12 - 49 %    Monocytes % 8 5 - 13 %    Eosinophils % 3 0 - 7 %    Basophils % 1 0 - 1 %    Immature Granulocytes 0 0.0 - 0.5 %    Neutrophils Absolute 4.2 1.8 - 8.0 K/UL    Lymphocytes Absolute 1.1 0.8 - 3.5 K/UL    Monocytes Absolute 0.5 0.0 - 1.0 K/UL    Eosinophils Absolute 0.2 0.0 - 0.4 K/UL    Basophils Absolute 0.1 0.0 - 0.1 K/UL    Absolute Immature Granulocyte 0.0 0.00 - 0.04 K/UL    Differential Type AUTOMATED     CMP    Collection Time: 11/01/23  7:06 PM   Result Value Ref Range    Sodium 144 136 - 145 mmol/L    Potassium 4.0 3.5 - 5.1 mmol/L    Chloride 110 (H) 97

## 2023-11-03 LAB
EKG ATRIAL RATE: 89 BPM
EKG DIAGNOSIS: NORMAL
EKG P AXIS: -21 DEGREES
EKG P-R INTERVAL: 112 MS
EKG Q-T INTERVAL: 356 MS
EKG QRS DURATION: 78 MS
EKG QTC CALCULATION (BAZETT): 433 MS
EKG R AXIS: 55 DEGREES
EKG T AXIS: 108 DEGREES
EKG VENTRICULAR RATE: 89 BPM

## 2023-11-03 PROCEDURE — 6360000002 HC RX W HCPCS: Performed by: INTERNAL MEDICINE

## 2023-11-03 PROCEDURE — 2580000003 HC RX 258: Performed by: NURSE PRACTITIONER

## 2023-11-03 PROCEDURE — 6370000000 HC RX 637 (ALT 250 FOR IP): Performed by: STUDENT IN AN ORGANIZED HEALTH CARE EDUCATION/TRAINING PROGRAM

## 2023-11-03 PROCEDURE — 2580000003 HC RX 258: Performed by: INTERNAL MEDICINE

## 2023-11-03 PROCEDURE — C9113 INJ PANTOPRAZOLE SODIUM, VIA: HCPCS | Performed by: INTERNAL MEDICINE

## 2023-11-03 PROCEDURE — 1100000003 HC PRIVATE W/ TELEMETRY

## 2023-11-03 PROCEDURE — 6360000002 HC RX W HCPCS: Performed by: NURSE PRACTITIONER

## 2023-11-03 PROCEDURE — 2580000003 HC RX 258: Performed by: STUDENT IN AN ORGANIZED HEALTH CARE EDUCATION/TRAINING PROGRAM

## 2023-11-03 PROCEDURE — A4216 STERILE WATER/SALINE, 10 ML: HCPCS | Performed by: INTERNAL MEDICINE

## 2023-11-03 RX ADMIN — POLYETHYLENE GLYCOL 3350 17 G: 17 POWDER, FOR SOLUTION ORAL at 20:47

## 2023-11-03 RX ADMIN — SODIUM CHLORIDE, PRESERVATIVE FREE 10 ML: 5 INJECTION INTRAVENOUS at 09:39

## 2023-11-03 RX ADMIN — SODIUM CHLORIDE, PRESERVATIVE FREE 40 MG: 5 INJECTION INTRAVENOUS at 06:16

## 2023-11-03 RX ADMIN — SODIUM CHLORIDE, PRESERVATIVE FREE 40 MG: 5 INJECTION INTRAVENOUS at 18:16

## 2023-11-03 RX ADMIN — SODIUM CHLORIDE 1000 MG: 900 INJECTION INTRAVENOUS at 06:12

## 2023-11-03 RX ADMIN — STANDARDIZED SENNA CONCENTRATE 17.2 MG: 8.6 TABLET ORAL at 20:47

## 2023-11-03 RX ADMIN — SODIUM CHLORIDE, POTASSIUM CHLORIDE, SODIUM LACTATE AND CALCIUM CHLORIDE: 600; 310; 30; 20 INJECTION, SOLUTION INTRAVENOUS at 06:12

## 2023-11-03 RX ADMIN — STANDARDIZED SENNA CONCENTRATE 17.2 MG: 8.6 TABLET ORAL at 09:38

## 2023-11-03 RX ADMIN — POLYETHYLENE GLYCOL 3350 17 G: 17 POWDER, FOR SOLUTION ORAL at 09:37

## 2023-11-03 RX ADMIN — SODIUM CHLORIDE, PRESERVATIVE FREE 10 ML: 5 INJECTION INTRAVENOUS at 20:47

## 2023-11-03 NOTE — CARE COORDINATION
Care Management Initial Assessment       RUR: 13%  Readmission? No  1st IM letter given? Yes  1st  letter given: No    CM called and introduced self to Swetha Doherty, verified pt is a LT resident at [facility]. Per Sister, pt is amputee, bedbound, and dependent with ADL's. Has a electric wheelchair. D/C to return back to [facility]. CM to arrange transportation. No other CM needs identified. 11/03/23 1503   Service Assessment   Patient Orientation Unable to Assess   History Provided By Child/Family  (Spoke with Sister - Delon Ceballos)   Primary Caregiver Other (Comment)  (LTC at [facility])   [address] Members; Children  (Sister- Delon Ceballos)   [address] is: Named in [address]   PCP Verified by CM Yes  (Followed at the facility)   Prior Functional Level Assistance with the following:;Bathing;Dressing; Toileting   Current Functional Level Assistance with the following:;Bathing;Dressing; Toileting;Cooking   Can patient return to prior living arrangement Yes   Family able to assist with home care needs: No   Financial Resources Medicare   Social/Functional History   Lives With Other (comment)  (LTC [facility])   [facility]   ADL Assistance Needs assistance   Bath Dependent/Total   Dressing Dependent/Total   Grooming Dependent/Total   Toileting Needs assistance   Meal Prep Maximal   Laundry Maximal   Vacuuming Maximal   Cleaning Maximal   Ambulation Assistance Non-ambulatory  (Dependent with tansfer, electric wheelchair,amputee)   Transfer Assistance Needs assistance   Patient's [address] provides as needed   Discharge Planning   Type of [facility])   Current Services Prior To Admission None   Potential Assistance Needed N/A   DME Ordered?  No   Patient expects to be discharged to: Long-term care  (LTC [facility])     Nino Basilio [address]  Phone: [phone]

## 2023-11-03 NOTE — PROGRESS NOTES
Patient rested for most part of the pain with no complaint of pain. Provided clifford care nimo sacrum care this afternoon (disimpacted done). Diet was advanced to clear liquid diet and patient tolerated diet well with no complaint of pain.

## 2023-11-03 NOTE — PROGRESS NOTES
below)  PMH/SH reviewed - no change compared to H&P  ________________________________________________________________________  Total time spent with patient: 15 minutes ________________________________________________________________________  Care Plan discussed with:  Patient y   Family     RN y              Consultant:       Yvon Jensen MD     Procedures: see electronic medical records for all procedures/Xrays and details which were not copied into this note but were reviewed prior to creation of Plan. LABS:  Recent Labs     11/01/23 1906 11/02/23  0616   WBC 6.1 8.0   HGB 13.1 11.3*   HCT 40.7 35.5*    243     Recent Labs     11/01/23  1906 11/02/23  0616    147*   K 4.0 3.9   * 114*   CO2 26 24   BUN 20 26*     Recent Labs     11/01/23  1906   GLOB 7.1*     No results for input(s): \"INR\", \"APTT\" in the last 72 hours. Invalid input(s): \"PTP\"   No results for input(s): \"TIBC\", \"FERR\" in the last 72 hours. Invalid input(s): \"FE\", \"PSAT\"   No results found for: \"FOL\", \"RBCF\"  No results for input(s): \"PH\", \"PCO2\", \"PO2\" in the last 72 hours. No results for input(s): \"CPK\", \"CKMB\", \"TROPONINI\" in the last 72 hours.   @[facility]@    MEDICATIONS:  Current Facility-Administered Medications   Medication Dose Route Frequency    cefTRIAXone (ROCEPHIN) 1,000 mg in sodium chloride 0.9 % 50 mL IVPB (mini-bag)  1,000 mg IntraVENous Q24H    lactated ringers IV soln infusion   IntraVENous Continuous    0.9 % sodium chloride infusion  25 mL IntraVENous PRN    pantoprazole (PROTONIX) 40 mg in sodium chloride (PF) 0.9 % 10 mL injection  40 mg IntraVENous Q12H    polyethylene glycol (GLYCOLAX) packet 17 g  17 g Oral BID    senna (SENOKOT) tablet 17.2 mg  2 tablet Oral BID    sodium chloride flush 0.9 % injection 5-40 mL  5-40 mL IntraVENous 2 times per day    sodium chloride flush 0.9 % injection 5-40 mL  5-40 mL IntraVENous PRN    0.9 % sodium chloride infusion   IntraVENous PRN    potassium chloride

## 2023-11-03 NOTE — PROGRESS NOTES
Comprehensive Nutrition Assessment    Type and Reason for Visit:  Initial, Positive Nutrition Screen    Nutrition Recommendations/Plan:   Continue current diet per GI  Add ensure enlive daily     Malnutrition Assessment:  Malnutrition Status:  No malnutrition (11/03/23 1417)      Nutrition Assessment:    Patient medically noted for dysphagia, esophageal stricture, and hiatal hernia. PMH right BKA. Patient s/p EGD and dilation. Advanced to a soft and bite sized/GI bland diet today. He reports a great appetite. Ate 100% of his lunch tray. No issues with swallowing. Reports a good appetite PTA; denies any recent weight loss. Drinking ensure 1-2x/day as needed and would like to receive ensure while admitted. Continue diet as tolerated. Encourage intake of meals/ONS. Nutrition Related Findings:    Na 147, K+ 3.9, BG 72   BM 11/1   Protonix, Glycolax, Senna   Wound Type: Stage IV (old stage 4 almost healed)       Current Nutrition Intake & Therapies:    Average Meal Intake: %     ADULT DIET; Dysphagia - Soft and Bite Sized; GI [place] (GERD/Peptic Ulcer)    Anthropometric Measures:  Height: 170.2 cm (5' 7\")  Ideal Body Weight (IBW): 148 lbs (67 kg)       Current Body Weight: 50.7 kg (111 lb 12.4 oz),   IBW. Current BMI (kg/m2): 17.5        Weight Adjustment For: Amputation  Total Adjusted Percentage (Calculated): 5.9           Adjusted BMI (kg/m2) (Calculated): 18.5  BMI Categories: Normal Weight (BMI 18.5-24. 9)    Estimated Daily Nutrient Needs:  Energy Requirements Based On: Formula  Weight Used for Energy Requirements: Current  Energy (kcal/day): 1471 kcals (BMR x 1. 2AF)  Weight Used for Protein Requirements: Current  Protein (g/day): 51-66g (1.0-1.3 g/kg bw)  Method Used for Fluid Requirements: 1 ml/kcal  Fluid (ml/day): 1500 mL    Nutrition Diagnosis:   No nutrition diagnosis at this time       Nutrition Interventions:   Food and/or Nutrient Delivery: Continue Current Diet, Start Oral Nutrition Supplement  Nutrition Education/Counseling: No recommendation at this time  Coordination of Nutrition Care: Continue to monitor while inpatient       Goals:     Goals: PO intake 75% or greater, by next RD assessment       Nutrition Monitoring and Evaluation:   Behavioral-Environmental Outcomes: None Identified  Food/Nutrient Intake Outcomes: Food and Nutrient Intake, Supplement Intake  Physical Signs/Symptoms Outcomes: Biochemical Data, Weight    Discharge Planning:    Continue current diet, Continue Oral Nutrition Supplement     Kaya Torrez RD  Contact: ext [phone]

## 2023-11-04 ENCOUNTER — APPOINTMENT (OUTPATIENT)
Facility: HOSPITAL | Age: 68
DRG: 391 | End: 2023-11-04
Payer: MEDICARE

## 2023-11-04 LAB
ANION GAP SERPL CALC-SCNC: 3 MMOL/L (ref 5–15)
BACTERIA SPEC CULT: ABNORMAL
BACTERIA SPEC CULT: ABNORMAL
BASOPHILS # BLD: 0.1 K/UL (ref 0–0.1)
BASOPHILS NFR BLD: 1 % (ref 0–1)
BUN SERPL-MCNC: 7 MG/DL (ref 6–20)
BUN/CREAT SERPL: 12 (ref 12–20)
CALCIUM SERPL-MCNC: 8.6 MG/DL (ref 8.5–10.1)
CC UR VC: ABNORMAL
CHLORIDE SERPL-SCNC: 109 MMOL/L (ref 97–108)
CO2 SERPL-SCNC: 28 MMOL/L (ref 21–32)
CREAT SERPL-MCNC: 0.59 MG/DL (ref 0.7–1.3)
DIFFERENTIAL METHOD BLD: ABNORMAL
EOSINOPHIL # BLD: 0.4 K/UL (ref 0–0.4)
EOSINOPHIL NFR BLD: 8 % (ref 0–7)
ERYTHROCYTE [DISTWIDTH] IN BLOOD BY AUTOMATED COUNT: 15.5 % (ref 11.5–14.5)
GLUCOSE SERPL-MCNC: 86 MG/DL (ref 65–100)
HCT VFR BLD AUTO: 31.4 % (ref 36.6–50.3)
HGB BLD-MCNC: 10.4 G/DL (ref 12.1–17)
IMM GRANULOCYTES # BLD AUTO: 0 K/UL (ref 0–0.04)
IMM GRANULOCYTES NFR BLD AUTO: 1 % (ref 0–0.5)
LYMPHOCYTES # BLD: 1.5 K/UL (ref 0.8–3.5)
LYMPHOCYTES NFR BLD: 27 % (ref 12–49)
MAGNESIUM SERPL-MCNC: 1.7 MG/DL (ref 1.6–2.4)
MCH RBC QN AUTO: 27.4 PG (ref 26–34)
MCHC RBC AUTO-ENTMCNC: 33.1 G/DL (ref 30–36.5)
MCV RBC AUTO: 82.6 FL (ref 80–99)
MONOCYTES # BLD: 0.6 K/UL (ref 0–1)
MONOCYTES NFR BLD: 10 % (ref 5–13)
NEUTS SEG # BLD: 3 K/UL (ref 1.8–8)
NEUTS SEG NFR BLD: 53 % (ref 32–75)
NRBC # BLD: 0 K/UL (ref 0–0.01)
NRBC BLD-RTO: 0 PER 100 WBC
PHOSPHATE SERPL-MCNC: 3 MG/DL (ref 2.6–4.7)
PLATELET # BLD AUTO: 200 K/UL (ref 150–400)
PMV BLD AUTO: 10.4 FL (ref 8.9–12.9)
POTASSIUM SERPL-SCNC: 3.3 MMOL/L (ref 3.5–5.1)
RBC # BLD AUTO: 3.8 M/UL (ref 4.1–5.7)
SERVICE CMNT-IMP: ABNORMAL
SODIUM SERPL-SCNC: 140 MMOL/L (ref 136–145)
WBC # BLD AUTO: 5.6 K/UL (ref 4.1–11.1)

## 2023-11-04 PROCEDURE — 80048 BASIC METABOLIC PNL TOTAL CA: CPT

## 2023-11-04 PROCEDURE — 6360000002 HC RX W HCPCS: Performed by: INTERNAL MEDICINE

## 2023-11-04 PROCEDURE — 84100 ASSAY OF PHOSPHORUS: CPT

## 2023-11-04 PROCEDURE — 36415 COLL VENOUS BLD VENIPUNCTURE: CPT

## 2023-11-04 PROCEDURE — 85025 COMPLETE CBC W/AUTO DIFF WBC: CPT

## 2023-11-04 PROCEDURE — 6360000002 HC RX W HCPCS: Performed by: NURSE PRACTITIONER

## 2023-11-04 PROCEDURE — 2580000003 HC RX 258: Performed by: INTERNAL MEDICINE

## 2023-11-04 PROCEDURE — A4216 STERILE WATER/SALINE, 10 ML: HCPCS | Performed by: STUDENT IN AN ORGANIZED HEALTH CARE EDUCATION/TRAINING PROGRAM

## 2023-11-04 PROCEDURE — 2580000003 HC RX 258: Performed by: NURSE PRACTITIONER

## 2023-11-04 PROCEDURE — A4216 STERILE WATER/SALINE, 10 ML: HCPCS | Performed by: INTERNAL MEDICINE

## 2023-11-04 PROCEDURE — 74176 CT ABD & PELVIS W/O CONTRAST: CPT

## 2023-11-04 PROCEDURE — 2580000003 HC RX 258: Performed by: STUDENT IN AN ORGANIZED HEALTH CARE EDUCATION/TRAINING PROGRAM

## 2023-11-04 PROCEDURE — 1100000003 HC PRIVATE W/ TELEMETRY

## 2023-11-04 PROCEDURE — 6370000000 HC RX 637 (ALT 250 FOR IP): Performed by: STUDENT IN AN ORGANIZED HEALTH CARE EDUCATION/TRAINING PROGRAM

## 2023-11-04 PROCEDURE — 6360000002 HC RX W HCPCS: Performed by: STUDENT IN AN ORGANIZED HEALTH CARE EDUCATION/TRAINING PROGRAM

## 2023-11-04 PROCEDURE — C9113 INJ PANTOPRAZOLE SODIUM, VIA: HCPCS | Performed by: INTERNAL MEDICINE

## 2023-11-04 PROCEDURE — 83735 ASSAY OF MAGNESIUM: CPT

## 2023-11-04 RX ORDER — POTASSIUM CHLORIDE 750 MG/1
40 TABLET, FILM COATED, EXTENDED RELEASE ORAL ONCE
Status: COMPLETED | OUTPATIENT
Start: 2023-11-04 | End: 2023-11-04

## 2023-11-04 RX ADMIN — POTASSIUM CHLORIDE 40 MEQ: 750 TABLET, FILM COATED, EXTENDED RELEASE ORAL at 12:58

## 2023-11-04 RX ADMIN — ENOXAPARIN SODIUM 30 MG: 100 INJECTION SUBCUTANEOUS at 10:24

## 2023-11-04 RX ADMIN — SODIUM CHLORIDE, PRESERVATIVE FREE 40 MG: 5 INJECTION INTRAVENOUS at 05:24

## 2023-11-04 RX ADMIN — SODIUM CHLORIDE 1000 MG: 9 INJECTION INTRAMUSCULAR; INTRAVENOUS; SUBCUTANEOUS at 12:58

## 2023-11-04 RX ADMIN — SODIUM CHLORIDE, PRESERVATIVE FREE 10 ML: 5 INJECTION INTRAVENOUS at 10:26

## 2023-11-04 RX ADMIN — SODIUM CHLORIDE 1000 MG: 900 INJECTION INTRAVENOUS at 05:23

## 2023-11-04 RX ADMIN — STANDARDIZED SENNA CONCENTRATE 17.2 MG: 8.6 TABLET ORAL at 20:33

## 2023-11-04 RX ADMIN — POLYETHYLENE GLYCOL 3350 17 G: 17 POWDER, FOR SOLUTION ORAL at 10:24

## 2023-11-04 RX ADMIN — STANDARDIZED SENNA CONCENTRATE 17.2 MG: 8.6 TABLET ORAL at 10:24

## 2023-11-04 RX ADMIN — SODIUM CHLORIDE, PRESERVATIVE FREE 40 MG: 5 INJECTION INTRAVENOUS at 18:02

## 2023-11-04 RX ADMIN — MEROPENEM 1000 MG: 1 INJECTION, POWDER, FOR SOLUTION INTRAVENOUS at 20:25

## 2023-11-04 RX ADMIN — POLYETHYLENE GLYCOL 3350 17 G: 17 POWDER, FOR SOLUTION ORAL at 20:32

## 2023-11-04 RX ADMIN — SODIUM CHLORIDE, PRESERVATIVE FREE 10 ML: 5 INJECTION INTRAVENOUS at 20:32

## 2023-11-04 NOTE — CARE COORDINATION
CM notified that pt will need IV Meropenem for 1 week starting today. CM notified [address] [facility] so they will be prepared when pt returns to them at d/c. CM will follow.     Lico oD, [address]

## 2023-11-04 NOTE — PROGRESS NOTES
Patient has a leaking urostomy bag,changed. two hour turning done, pericare done and bedding changed. Bedside, Verbal, and Written shift change report given to [facility] Davin [facility]eros (oncoming nurse) by Faby Goncalves (offgoing nurse). Report included the following information MAR, Recent Results, and Med Rec Status.

## 2023-11-04 NOTE — PROGRESS NOTES
Spoke to Attending and PICC team concerning PICC placement. Made Vascular team aware that Pt to be on Meropenem until 11/10 and that he is to be tentatively discharged on Monday. Also that Dr. Bronson Benavides stated that he is ok with either PICC line or midline at discharge.  PICC team will place line either today or Monday for discharge per vascular team.

## 2023-11-05 LAB
ANION GAP SERPL CALC-SCNC: 5 MMOL/L (ref 5–15)
BASOPHILS # BLD: 0.1 K/UL (ref 0–0.1)
BASOPHILS NFR BLD: 1 % (ref 0–1)
BUN SERPL-MCNC: 14 MG/DL (ref 6–20)
BUN/CREAT SERPL: 19 (ref 12–20)
CALCIUM SERPL-MCNC: 9.1 MG/DL (ref 8.5–10.1)
CHLORIDE SERPL-SCNC: 108 MMOL/L (ref 97–108)
CO2 SERPL-SCNC: 29 MMOL/L (ref 21–32)
CREAT SERPL-MCNC: 0.74 MG/DL (ref 0.7–1.3)
DIFFERENTIAL METHOD BLD: ABNORMAL
EOSINOPHIL # BLD: 0.4 K/UL (ref 0–0.4)
EOSINOPHIL NFR BLD: 8 % (ref 0–7)
ERYTHROCYTE [DISTWIDTH] IN BLOOD BY AUTOMATED COUNT: 15.7 % (ref 11.5–14.5)
GLUCOSE SERPL-MCNC: 91 MG/DL (ref 65–100)
HCT VFR BLD AUTO: 34.3 % (ref 36.6–50.3)
HGB BLD-MCNC: 11.2 G/DL (ref 12.1–17)
IMM GRANULOCYTES # BLD AUTO: 0 K/UL (ref 0–0.04)
IMM GRANULOCYTES NFR BLD AUTO: 0 % (ref 0–0.5)
LYMPHOCYTES # BLD: 1.9 K/UL (ref 0.8–3.5)
LYMPHOCYTES NFR BLD: 34 % (ref 12–49)
MAGNESIUM SERPL-MCNC: 1.9 MG/DL (ref 1.6–2.4)
MCH RBC QN AUTO: 26.8 PG (ref 26–34)
MCHC RBC AUTO-ENTMCNC: 32.7 G/DL (ref 30–36.5)
MCV RBC AUTO: 82.1 FL (ref 80–99)
MONOCYTES # BLD: 0.6 K/UL (ref 0–1)
MONOCYTES NFR BLD: 11 % (ref 5–13)
NEUTS SEG # BLD: 2.5 K/UL (ref 1.8–8)
NEUTS SEG NFR BLD: 46 % (ref 32–75)
NRBC # BLD: 0 K/UL (ref 0–0.01)
NRBC BLD-RTO: 0 PER 100 WBC
PHOSPHATE SERPL-MCNC: 2.8 MG/DL (ref 2.6–4.7)
PLATELET # BLD AUTO: 226 K/UL (ref 150–400)
PMV BLD AUTO: 11 FL (ref 8.9–12.9)
POTASSIUM SERPL-SCNC: 4.1 MMOL/L (ref 3.5–5.1)
RBC # BLD AUTO: 4.18 M/UL (ref 4.1–5.7)
SODIUM SERPL-SCNC: 142 MMOL/L (ref 136–145)
WBC # BLD AUTO: 5.5 K/UL (ref 4.1–11.1)

## 2023-11-05 PROCEDURE — 2580000003 HC RX 258: Performed by: INTERNAL MEDICINE

## 2023-11-05 PROCEDURE — 2580000003 HC RX 258: Performed by: NURSE PRACTITIONER

## 2023-11-05 PROCEDURE — 84100 ASSAY OF PHOSPHORUS: CPT

## 2023-11-05 PROCEDURE — 36415 COLL VENOUS BLD VENIPUNCTURE: CPT

## 2023-11-05 PROCEDURE — 85025 COMPLETE CBC W/AUTO DIFF WBC: CPT

## 2023-11-05 PROCEDURE — C9113 INJ PANTOPRAZOLE SODIUM, VIA: HCPCS | Performed by: INTERNAL MEDICINE

## 2023-11-05 PROCEDURE — 6370000000 HC RX 637 (ALT 250 FOR IP): Performed by: STUDENT IN AN ORGANIZED HEALTH CARE EDUCATION/TRAINING PROGRAM

## 2023-11-05 PROCEDURE — 83735 ASSAY OF MAGNESIUM: CPT

## 2023-11-05 PROCEDURE — 6360000002 HC RX W HCPCS: Performed by: INTERNAL MEDICINE

## 2023-11-05 PROCEDURE — 6360000002 HC RX W HCPCS: Performed by: STUDENT IN AN ORGANIZED HEALTH CARE EDUCATION/TRAINING PROGRAM

## 2023-11-05 PROCEDURE — 1100000003 HC PRIVATE W/ TELEMETRY

## 2023-11-05 PROCEDURE — A4216 STERILE WATER/SALINE, 10 ML: HCPCS | Performed by: INTERNAL MEDICINE

## 2023-11-05 PROCEDURE — 2580000003 HC RX 258: Performed by: STUDENT IN AN ORGANIZED HEALTH CARE EDUCATION/TRAINING PROGRAM

## 2023-11-05 PROCEDURE — 6360000002 HC RX W HCPCS: Performed by: NURSE PRACTITIONER

## 2023-11-05 PROCEDURE — 80048 BASIC METABOLIC PNL TOTAL CA: CPT

## 2023-11-05 RX ADMIN — MEROPENEM 1000 MG: 1 INJECTION, POWDER, FOR SOLUTION INTRAVENOUS at 19:06

## 2023-11-05 RX ADMIN — STANDARDIZED SENNA CONCENTRATE 17.2 MG: 8.6 TABLET ORAL at 21:24

## 2023-11-05 RX ADMIN — SODIUM CHLORIDE, PRESERVATIVE FREE 40 MG: 5 INJECTION INTRAVENOUS at 04:08

## 2023-11-05 RX ADMIN — POLYETHYLENE GLYCOL 3350 17 G: 17 POWDER, FOR SOLUTION ORAL at 21:24

## 2023-11-05 RX ADMIN — POLYETHYLENE GLYCOL 3350 17 G: 17 POWDER, FOR SOLUTION ORAL at 09:16

## 2023-11-05 RX ADMIN — MEROPENEM 1000 MG: 1 INJECTION, POWDER, FOR SOLUTION INTRAVENOUS at 11:24

## 2023-11-05 RX ADMIN — ENOXAPARIN SODIUM 30 MG: 100 INJECTION SUBCUTANEOUS at 09:16

## 2023-11-05 RX ADMIN — MEROPENEM 1000 MG: 1 INJECTION, POWDER, FOR SOLUTION INTRAVENOUS at 04:07

## 2023-11-05 RX ADMIN — ALUMINUM HYDROXIDE AND MAGNESIUM HYDROXIDE 30 ML: 200; 200 SUSPENSION ORAL at 16:36

## 2023-11-05 RX ADMIN — SODIUM CHLORIDE, PRESERVATIVE FREE 40 MG: 5 INJECTION INTRAVENOUS at 16:36

## 2023-11-05 RX ADMIN — SODIUM CHLORIDE, PRESERVATIVE FREE 10 ML: 5 INJECTION INTRAVENOUS at 09:37

## 2023-11-05 RX ADMIN — SODIUM CHLORIDE, PRESERVATIVE FREE 10 ML: 5 INJECTION INTRAVENOUS at 21:24

## 2023-11-05 ASSESSMENT — PAIN SCALES - GENERAL: PAINLEVEL_OUTOF10: 0

## 2023-11-05 NOTE — PROGRESS NOTES
Hospitalist Progress Note    NAME:   Sen Hernandez   : 1955   MRN: [id number]     Date/Time: 2023 3:33 PM  Patient PCP: No primary care provider on file. Estimated discharge date:   Barriers: IV abx set up for outpatient    Assessment / Plan:    Esophageal Dysphagia  Patient with 2-day history of emesis following ingestion of food or liquid without associated nausea or chest pain; patient reported food or liquid feels like it \"gets stuck in his chest\"  EZ Gas II and glucagon tablet not tolerated   Etiology esophageal stricture versus impaction  GI has been consulted, underwent EGD , showed stricture that was dilated  - advancing diet, toelrating    UTI - ESBL Klebsiella, Proteus  Moderate L hydronephrosis  Patient with urostomy; per patient, urostomy placed at [facility] in  due to urinary retention  Urinalysis showed 2+ bacteria, large amount leukocyte esterase  Ceftriaxone  -   - sensi's noted, started meropenem, will need 1 week  - CT abd showing moderate L hydronephrosis with nonobstructing stone  - consulted Urology given hydronephrosis to determine if he requires any intervention or followup, appreciate assistance  - will need PICC line and home IV abx set up                Sacral Decubitus Wounds POA  Mostly healed; small areas with skin tearing and with mild bleeding  Wound consultation appreciated    Constipation  - added scheduled miralax and senna, was disimpacted earlier in admission     Medical Decision Making:   I personally reviewed labs: CBC, BMP  I personally reviewed imaging:  Toxic drug monitoring: monitor platelets for thrombocytopenia from meropenem toxicity  Discussed case with:      Code Status: FULL  DVT Prophylaxis: SCDs  GI Prophylaxis:    Subjective:     Chief Complaint / Reason for Physician Visit  \"Followup dysphagia\". Discussed with RN events overnight. Feels ok today.       Objective:     VITALS:   Last 24hrs VS reviewed since

## 2023-11-05 NOTE — PLAN OF CARE
Problem: Skin/Tissue Integrity  Goal: Absence of new skin breakdown  Description: 1. Monitor for areas of redness and/or skin breakdown  2. Assess vascular access sites hourly  3. Every 4-6 hours minimum:  Change oxygen saturation probe site  4. Every 4-6 hours:  If on nasal continuous positive airway pressure, respiratory therapy assess nares and determine need for appliance change or resting period.   Outcome: Progressing     Problem: Discharge Planning  Goal: Discharge to home or other facility with appropriate resources  Outcome: Progressing  Flowsheets (Taken 11/4/2023 2020)  Discharge to home or other facility with appropriate resources: Identify barriers to discharge with patient and caregiver     Problem: Safety - Adult  Goal: Free from fall injury  Outcome: Progressing

## 2023-11-06 LAB
ANION GAP SERPL CALC-SCNC: 5 MMOL/L (ref 5–15)
BASOPHILS # BLD: 0.1 K/UL (ref 0–0.1)
BASOPHILS NFR BLD: 1 % (ref 0–1)
BUN SERPL-MCNC: 11 MG/DL (ref 6–20)
BUN/CREAT SERPL: 21 (ref 12–20)
CALCIUM SERPL-MCNC: 8.9 MG/DL (ref 8.5–10.1)
CHLORIDE SERPL-SCNC: 109 MMOL/L (ref 97–108)
CO2 SERPL-SCNC: 27 MMOL/L (ref 21–32)
CREAT SERPL-MCNC: 0.53 MG/DL (ref 0.7–1.3)
DIFFERENTIAL METHOD BLD: ABNORMAL
EOSINOPHIL # BLD: 0.5 K/UL (ref 0–0.4)
EOSINOPHIL NFR BLD: 10 % (ref 0–7)
ERYTHROCYTE [DISTWIDTH] IN BLOOD BY AUTOMATED COUNT: 15.8 % (ref 11.5–14.5)
GLUCOSE SERPL-MCNC: 84 MG/DL (ref 65–100)
HCT VFR BLD AUTO: 33.5 % (ref 36.6–50.3)
HGB BLD-MCNC: 10.7 G/DL (ref 12.1–17)
IMM GRANULOCYTES # BLD AUTO: 0 K/UL (ref 0–0.04)
IMM GRANULOCYTES NFR BLD AUTO: 1 % (ref 0–0.5)
LYMPHOCYTES # BLD: 1.6 K/UL (ref 0.8–3.5)
LYMPHOCYTES NFR BLD: 34 % (ref 12–49)
MAGNESIUM SERPL-MCNC: 1.9 MG/DL (ref 1.6–2.4)
MCH RBC QN AUTO: 26.5 PG (ref 26–34)
MCHC RBC AUTO-ENTMCNC: 31.9 G/DL (ref 30–36.5)
MCV RBC AUTO: 82.9 FL (ref 80–99)
MONOCYTES # BLD: 0.5 K/UL (ref 0–1)
MONOCYTES NFR BLD: 12 % (ref 5–13)
NEUTS SEG # BLD: 2 K/UL (ref 1.8–8)
NEUTS SEG NFR BLD: 42 % (ref 32–75)
NRBC # BLD: 0 K/UL (ref 0–0.01)
NRBC BLD-RTO: 0 PER 100 WBC
PHOSPHATE SERPL-MCNC: 2.5 MG/DL (ref 2.6–4.7)
PLATELET # BLD AUTO: 205 K/UL (ref 150–400)
PMV BLD AUTO: 10.5 FL (ref 8.9–12.9)
POTASSIUM SERPL-SCNC: 3.8 MMOL/L (ref 3.5–5.1)
RBC # BLD AUTO: 4.04 M/UL (ref 4.1–5.7)
SODIUM SERPL-SCNC: 141 MMOL/L (ref 136–145)
WBC # BLD AUTO: 4.6 K/UL (ref 4.1–11.1)

## 2023-11-06 PROCEDURE — A4216 STERILE WATER/SALINE, 10 ML: HCPCS | Performed by: INTERNAL MEDICINE

## 2023-11-06 PROCEDURE — 02HV33Z INSERTION OF INFUSION DEVICE INTO SUPERIOR VENA CAVA, PERCUTANEOUS APPROACH: ICD-10-PCS | Performed by: STUDENT IN AN ORGANIZED HEALTH CARE EDUCATION/TRAINING PROGRAM

## 2023-11-06 PROCEDURE — 80048 BASIC METABOLIC PNL TOTAL CA: CPT

## 2023-11-06 PROCEDURE — 1100000003 HC PRIVATE W/ TELEMETRY

## 2023-11-06 PROCEDURE — 6370000000 HC RX 637 (ALT 250 FOR IP): Performed by: STUDENT IN AN ORGANIZED HEALTH CARE EDUCATION/TRAINING PROGRAM

## 2023-11-06 PROCEDURE — 84100 ASSAY OF PHOSPHORUS: CPT

## 2023-11-06 PROCEDURE — 99223 1ST HOSP IP/OBS HIGH 75: CPT | Performed by: INTERNAL MEDICINE

## 2023-11-06 PROCEDURE — 76937 US GUIDE VASCULAR ACCESS: CPT

## 2023-11-06 PROCEDURE — C9113 INJ PANTOPRAZOLE SODIUM, VIA: HCPCS | Performed by: INTERNAL MEDICINE

## 2023-11-06 PROCEDURE — 36410 VNPNXR 3YR/> PHY/QHP DX/THER: CPT

## 2023-11-06 PROCEDURE — 36415 COLL VENOUS BLD VENIPUNCTURE: CPT

## 2023-11-06 PROCEDURE — 2580000003 HC RX 258: Performed by: NURSE PRACTITIONER

## 2023-11-06 PROCEDURE — 6360000002 HC RX W HCPCS: Performed by: STUDENT IN AN ORGANIZED HEALTH CARE EDUCATION/TRAINING PROGRAM

## 2023-11-06 PROCEDURE — 83735 ASSAY OF MAGNESIUM: CPT

## 2023-11-06 PROCEDURE — 2580000003 HC RX 258: Performed by: STUDENT IN AN ORGANIZED HEALTH CARE EDUCATION/TRAINING PROGRAM

## 2023-11-06 PROCEDURE — 2580000003 HC RX 258: Performed by: INTERNAL MEDICINE

## 2023-11-06 PROCEDURE — 6360000002 HC RX W HCPCS: Performed by: NURSE PRACTITIONER

## 2023-11-06 PROCEDURE — 85025 COMPLETE CBC W/AUTO DIFF WBC: CPT

## 2023-11-06 PROCEDURE — C1751 CATH, INF, PER/CENT/MIDLINE: HCPCS

## 2023-11-06 PROCEDURE — 6360000002 HC RX W HCPCS: Performed by: INTERNAL MEDICINE

## 2023-11-06 RX ADMIN — POLYETHYLENE GLYCOL 3350 17 G: 17 POWDER, FOR SOLUTION ORAL at 22:50

## 2023-11-06 RX ADMIN — SODIUM CHLORIDE, PRESERVATIVE FREE 10 ML: 5 INJECTION INTRAVENOUS at 22:50

## 2023-11-06 RX ADMIN — POLYETHYLENE GLYCOL 3350 17 G: 17 POWDER, FOR SOLUTION ORAL at 09:00

## 2023-11-06 RX ADMIN — SODIUM CHLORIDE, PRESERVATIVE FREE 10 ML: 5 INJECTION INTRAVENOUS at 09:00

## 2023-11-06 RX ADMIN — MEROPENEM 1000 MG: 1 INJECTION, POWDER, FOR SOLUTION INTRAVENOUS at 18:40

## 2023-11-06 RX ADMIN — SODIUM CHLORIDE, PRESERVATIVE FREE 40 MG: 5 INJECTION INTRAVENOUS at 18:40

## 2023-11-06 RX ADMIN — STANDARDIZED SENNA CONCENTRATE 17.2 MG: 8.6 TABLET ORAL at 22:50

## 2023-11-06 RX ADMIN — MEROPENEM 1000 MG: 1 INJECTION, POWDER, FOR SOLUTION INTRAVENOUS at 03:44

## 2023-11-06 RX ADMIN — ENOXAPARIN SODIUM 30 MG: 100 INJECTION SUBCUTANEOUS at 09:00

## 2023-11-06 RX ADMIN — MEROPENEM 1000 MG: 1 INJECTION, POWDER, FOR SOLUTION INTRAVENOUS at 11:33

## 2023-11-06 RX ADMIN — SODIUM CHLORIDE, PRESERVATIVE FREE 40 MG: 5 INJECTION INTRAVENOUS at 05:57

## 2023-11-06 ASSESSMENT — PAIN SCALES - GENERAL
PAINLEVEL_OUTOF10: 0
PAINLEVEL_OUTOF10: 0

## 2023-11-06 NOTE — PROCEDURES
Midline insertion procedure note:  Pt has very limited vascular access. Procedure explained to patient along with risks and benefits. Procedure teaching completed. Pre procedure assessment done. Patient denies questions or concerns at this time. Midline sign over the [facility]. Maximum sterile barrier precautions observed throughout procedure. Cannulated Brachial vein using ultrasound guidance. Inserted 20 gauge 10 cm single lumen midline to Left upper arm. Blood return verified and flushed with 20ml normal saline. Sterile dressing applied with CHG, StatLock and occlusive dressing as per protocol. Curos caps applied to port. Patient tolerated procedure well with minimal blood loss. Reason for access : IV antibioitcs  Midline is CT and MRI compatible. Inserted by : Shaniqua Umanzor RN,VAT Nurse  Assisted by : ELICIA Guardado, RN Vascular Access Nurse  Total Catheter Length:  10 cm  Internal Catheter Length : 10 cm   External Length : 0 cm    arm circumference : 28 cm  Catheter occupies 13% of vein. Type of Midline: Tara Murphy  Ref#: [id number]  Lot#: [id number]  Expiration Date: 07-  Informed primary nurse Tamera Eddy RN midline is ready for use and to hang new infusion tubing prior to use.      Shaniqua Umanzor RN VA/BC   Vascular Access Nurse

## 2023-11-06 NOTE — CONSULTS
Infectious Disease Consult    Date of Consultation:  November 6, 2023  Reason for Consultation: ESBL UTI  Referring Physician: Dr Kathrin Slaughter  Date of Admission:11/1/2023      Impression    ESBL Klebsiella and Proteus UTI  H/o Ileal conduit diversion at [facility]  Nonobstructing stones  Left Hydronephrosis on CT abdomen/pelvis 11/4  Also seen on previous CT back on 7/7/2023  Urine culture 11/1+ >100,000 ESBL Klebsiella pneumoniae  & P.mirabilis. S/p evaluation by Urology. Esophageal dysphagia  S/p EGD & dilatation of stricture    H/o R/BKA    Sacral decubitus stage   Nearly healed. Plan  Meropenem IV x 10 days. Antimicrobial orders for discharge  -Meropenem 1 g   IV every 8 hours  end date 11/14  -Pull line at end of therapy.    -Weekly CBC, CMP-on Thursdays  -Fax reports to [phone], call with critical labs  at [phone]/ [phone]  -Encourage adequate fluids, daily probiotic/yogurt  -If line malfunction occurs and home health cannot reposition  please send patient to ED immediately  -ID follow-up -no follow-up required  If persistent side effects occur stop antibiotic and call-ID      Possible adverse effects of long term antibiotics are inclusive of but not limited to following  BM suppression, neutropenia , cytopenias , aplastic anemia hemorrhage liver & renal dysfunction/ liver , renal failure  , GI dysfunction- N, V  Diarrhea,C.difficile disease, rash , allergy , anaphylaxis. toxic epidermal necrolysis  Neuro toxicity , seizure disorder  Side effects tend to be more pronounced in the elderly. D/w pt    May DC from ID standpoint      Extensive review of chart notes, labs, imaging, cultures done  Additionally review of done: Recent reports-Labs, cultures, imaging  D/w -hospitalist, LYNN Roche Maude is a 77 y.o.  male with PMHx significant for right AKA, and urostomy at [facility] in 2021 who presented on from [facility] with chief complaint of inability to keep food or liquids down.   Of note, patient

## 2023-11-06 NOTE — PROGRESS NOTES
Hospitalist Progress Note    NAME:   Berto Sadler   : 1955   MRN: [id number]     Date/Time: 2023 6:05 PM  Patient PCP: No primary care provider on file. Estimated discharge date:   Barriers: SNF ability to take back, should be tomorrow    Assessment / Plan:    Esophageal Dysphagia  Patient with 2-day history of emesis following ingestion of food or liquid without associated nausea or chest pain; patient reported food or liquid feels like it \"gets stuck in his chest\"  EZ Gas II and glucagon tablet not tolerated   Etiology esophageal stricture versus impaction  GI has been consulted, underwent EGD , showed stricture that was dilated  - advancing diet, tolerating    UTI - ESBL Klebsiella, Proteus  Moderate L hydronephrosis  Patient with urostomy; per patient, urostomy placed at [facility] in  due to urinary retention  Urinalysis showed 2+ bacteria, large amount leukocyte esterase  Ceftriaxone  -   - sensi's noted, started meropenem, will need 1 week  - CT abd showing moderate L hydronephrosis with nonobstructing stone  - consulted Urology given hydronephrosis to determine if he requires any intervention or followup, appreciate assistance  - followup with [facility]  - will need PICC line and home IV abx set up  - ID to see today, appreciate assistance                Sacral Decubitus Wounds POA  Mostly healed; small areas with skin tearing and with mild bleeding  Wound consultation appreciated    Constipation  - added scheduled miralax and senna, was disimpacted earlier in admission     Medical Decision Making:   I personally reviewed labs: CBC, BMP  I personally reviewed imaging:  Toxic drug monitoring: monitor platelets for thrombocytopenia from meropenem toxicity  Discussed case with: ID,      Code Status: FULL  DVT Prophylaxis: SCDs  GI Prophylaxis:    Subjective:     Chief Complaint / Reason for Physician Visit  \"Followup dysphagia\".   Discussed with RN

## 2023-11-06 NOTE — CONSULTS
Urology Consult    Patient: Carlos Manuel Bautista MRN: [id number]  SSN: [id number]    YOB: 1955  Age: 77 y.o. Sex: male          Date of Consultation:  November 6, 2023  Requesting Physician: Delmis Negrete MD  Reason for Consultation: hydronephrosis            Assessment/Plan:  Hx of ileal conduit diversion at [facility] 2020- neurogenic bladder   ESBL UTI  Other non obstructing stones  Bladder diverticuli  Hx of paraplegia- chronic decub ulcers      -hydronephrosis with conduit likely chronic reflux, no  intervention in setting of normal renal function and nontoxic exam. Conduit draining without issues. Discussed OP follow up with his primary urologist at [facility]  -signing off    Supervising MD, Dr. Ta Poster        History of Present Illness:  Patient is a 77 y.o. male admitted 11/1/2023 to the hospital for Esophageal dysphagia [R13.19]  Dysphagia [R13.10]. He is admitted with esopheageal stricture s/p EGD dilation. Urology consulted today for results of CT a/p 11/4 revealing stable ileal conduit with moderate left hydronephrosis in setting of normal renal function. Pt seen and examined. Denies flank or suprapubic pain. No issues with urine output of stoma. His chart and images were reviewed:  Af, vss  Creat wnl  HD stable  11/1 ESBL UTI  CT ABDOMEN PELVIS WO CONTRAST Additional Contrast? None    Result Date: 11/4/2023  1. Moderate left-sided hydronephrosis. No definite obstructing lesion is identified, differential includes ureteral anastomotic stricture. There is a small nonobstructing stone in the left kidney. 2. There is irregular thickening of the bladder, with numerous diverticuli, compatible with acute cystitis. A 3 ventral hernia containing a loop of small bowel and transverse colon, without evidence of associated ischemia or obstruction. Past Medical History:   Allergies   Allergen Reactions    Iodinated Contrast Media Shortness Of Breath and Myalgia     received contrast dye for CT

## 2023-11-06 NOTE — CARE COORDINATION
Transition of Care Plan:    RUR: 13%  Prior Level of Functioning: LTC- needs assistance   Disposition: return to [place] under [place] contract for placement   Follow up appointments: PCP, specialists as indicated   DME needed: N/A  Transportation at discharge: [facility] requested, 10AM on 11/7  IM/IMM Medicare/ letter given: to be given  Is patient a [place] and connected with VA? YES   If yes, was [facility] transfer form completed and VA notified? Faxed   Caregiver Contact: Guillaume Shoemaker (sister)  Discharge Caregiver contacted prior to discharge? To be contacted   Care Conference needed? no  Barriers to discharge:  none     Chart reviewed. CM continuing to follow for d/c planning. ID and urology consult to be completed today. PICC line placed- pt to return [facility] tomorrow AM. CM faxed clinical updates and IV ABX orders to the [place] for review. Spoke with Khari Acuna- confirmed that the [place] contract will need to be revised to include the IV ABX to coordinate payment to the facility [facility]). CM requested [facility] medical transport, ETA 10AM tomorrow morning. Will continue to follow.     Alma Rosa Mosley MSW   Care Manager, [facility]

## 2023-11-06 NOTE — PLAN OF CARE
Problem: Skin/Tissue Integrity  Goal: Absence of new skin breakdown  Description: 1. Monitor for areas of redness and/or skin breakdown  2. Assess vascular access sites hourly  3. Every 4-6 hours minimum:  Change oxygen saturation probe site  4. Every 4-6 hours:  If on nasal continuous positive airway pressure, respiratory therapy assess nares and determine need for appliance change or resting period.   Outcome: Progressing     Problem: Discharge Planning  Goal: Discharge to home or other facility with appropriate resources  Outcome: Progressing     Problem: Safety - Adult  Goal: Free from fall injury  Outcome: Progressing     Problem: Pain  Goal: Verbalizes/displays adequate comfort level or baseline comfort level  Outcome: Progressing

## 2023-11-07 VITALS
HEART RATE: 79 BPM | RESPIRATION RATE: 16 BRPM | TEMPERATURE: 97.9 F | WEIGHT: 111.77 LBS | HEIGHT: 67 IN | DIASTOLIC BLOOD PRESSURE: 75 MMHG | SYSTOLIC BLOOD PRESSURE: 118 MMHG | OXYGEN SATURATION: 95 % | BODY MASS INDEX: 17.54 KG/M2

## 2023-11-07 PROBLEM — N39.0 URINARY TRACT INFECTION DUE TO ESBL KLEBSIELLA: Status: ACTIVE | Noted: 2023-11-07

## 2023-11-07 PROBLEM — T87.43 RIGHT BKA INFECTION (HCC): Status: ACTIVE | Noted: 2023-11-07

## 2023-11-07 PROBLEM — Z98.890 HISTORY OF UROSTOMY: Status: ACTIVE | Noted: 2023-11-07

## 2023-11-07 PROBLEM — L89.159 PRESSURE INJURY OF SKIN OF SACRAL REGION: Status: ACTIVE | Noted: 2023-11-07

## 2023-11-07 PROBLEM — N39.0 URINARY TRACT INFECTION DUE TO PROTEUS: Status: ACTIVE | Noted: 2023-11-07

## 2023-11-07 PROBLEM — B96.89 URINARY TRACT INFECTION DUE TO ESBL KLEBSIELLA: Status: ACTIVE | Noted: 2023-11-07

## 2023-11-07 PROBLEM — B96.4 URINARY TRACT INFECTION DUE TO PROTEUS: Status: ACTIVE | Noted: 2023-11-07

## 2023-11-07 LAB
ANION GAP SERPL CALC-SCNC: 4 MMOL/L (ref 5–15)
BASOPHILS # BLD: 0.1 K/UL (ref 0–0.1)
BASOPHILS NFR BLD: 1 % (ref 0–1)
BUN SERPL-MCNC: 13 MG/DL (ref 6–20)
BUN/CREAT SERPL: 20 (ref 12–20)
CALCIUM SERPL-MCNC: 9.3 MG/DL (ref 8.5–10.1)
CHLORIDE SERPL-SCNC: 106 MMOL/L (ref 97–108)
CO2 SERPL-SCNC: 29 MMOL/L (ref 21–32)
CREAT SERPL-MCNC: 0.66 MG/DL (ref 0.7–1.3)
DIFFERENTIAL METHOD BLD: ABNORMAL
EOSINOPHIL # BLD: 0.4 K/UL (ref 0–0.4)
EOSINOPHIL NFR BLD: 8 % (ref 0–7)
ERYTHROCYTE [DISTWIDTH] IN BLOOD BY AUTOMATED COUNT: 15.5 % (ref 11.5–14.5)
GLUCOSE SERPL-MCNC: 101 MG/DL (ref 65–100)
HCT VFR BLD AUTO: 33.7 % (ref 36.6–50.3)
HGB BLD-MCNC: 11.2 G/DL (ref 12.1–17)
IMM GRANULOCYTES # BLD AUTO: 0 K/UL (ref 0–0.04)
IMM GRANULOCYTES NFR BLD AUTO: 1 % (ref 0–0.5)
LYMPHOCYTES # BLD: 1.8 K/UL (ref 0.8–3.5)
LYMPHOCYTES NFR BLD: 33 % (ref 12–49)
MAGNESIUM SERPL-MCNC: 1.9 MG/DL (ref 1.6–2.4)
MCH RBC QN AUTO: 27.5 PG (ref 26–34)
MCHC RBC AUTO-ENTMCNC: 33.2 G/DL (ref 30–36.5)
MCV RBC AUTO: 82.6 FL (ref 80–99)
MONOCYTES # BLD: 0.7 K/UL (ref 0–1)
MONOCYTES NFR BLD: 12 % (ref 5–13)
NEUTS SEG # BLD: 2.4 K/UL (ref 1.8–8)
NEUTS SEG NFR BLD: 45 % (ref 32–75)
NRBC # BLD: 0 K/UL (ref 0–0.01)
NRBC BLD-RTO: 0 PER 100 WBC
PHOSPHATE SERPL-MCNC: 2.4 MG/DL (ref 2.6–4.7)
PLATELET # BLD AUTO: 212 K/UL (ref 150–400)
PMV BLD AUTO: 10.7 FL (ref 8.9–12.9)
POTASSIUM SERPL-SCNC: 4 MMOL/L (ref 3.5–5.1)
RBC # BLD AUTO: 4.08 M/UL (ref 4.1–5.7)
SODIUM SERPL-SCNC: 139 MMOL/L (ref 136–145)
WBC # BLD AUTO: 5.3 K/UL (ref 4.1–11.1)

## 2023-11-07 PROCEDURE — 6360000002 HC RX W HCPCS: Performed by: STUDENT IN AN ORGANIZED HEALTH CARE EDUCATION/TRAINING PROGRAM

## 2023-11-07 PROCEDURE — 2580000003 HC RX 258: Performed by: STUDENT IN AN ORGANIZED HEALTH CARE EDUCATION/TRAINING PROGRAM

## 2023-11-07 PROCEDURE — 2580000003 HC RX 258: Performed by: INTERNAL MEDICINE

## 2023-11-07 PROCEDURE — 80048 BASIC METABOLIC PNL TOTAL CA: CPT

## 2023-11-07 PROCEDURE — 84100 ASSAY OF PHOSPHORUS: CPT

## 2023-11-07 PROCEDURE — 6370000000 HC RX 637 (ALT 250 FOR IP): Performed by: STUDENT IN AN ORGANIZED HEALTH CARE EDUCATION/TRAINING PROGRAM

## 2023-11-07 PROCEDURE — 36415 COLL VENOUS BLD VENIPUNCTURE: CPT

## 2023-11-07 PROCEDURE — 6360000002 HC RX W HCPCS: Performed by: INTERNAL MEDICINE

## 2023-11-07 PROCEDURE — 2580000003 HC RX 258: Performed by: NURSE PRACTITIONER

## 2023-11-07 PROCEDURE — 85025 COMPLETE CBC W/AUTO DIFF WBC: CPT

## 2023-11-07 PROCEDURE — 83735 ASSAY OF MAGNESIUM: CPT

## 2023-11-07 PROCEDURE — C9113 INJ PANTOPRAZOLE SODIUM, VIA: HCPCS | Performed by: INTERNAL MEDICINE

## 2023-11-07 PROCEDURE — A4216 STERILE WATER/SALINE, 10 ML: HCPCS | Performed by: INTERNAL MEDICINE

## 2023-11-07 PROCEDURE — 6360000002 HC RX W HCPCS: Performed by: NURSE PRACTITIONER

## 2023-11-07 RX ORDER — PANTOPRAZOLE SODIUM 40 MG/1
40 TABLET, DELAYED RELEASE ORAL 2 TIMES DAILY
Qty: 90 TABLET | Refills: 1 | Status: SHIPPED | OUTPATIENT
Start: 2023-11-07

## 2023-11-07 RX ADMIN — MEROPENEM 1000 MG: 1 INJECTION, POWDER, FOR SOLUTION INTRAVENOUS at 12:19

## 2023-11-07 RX ADMIN — STANDARDIZED SENNA CONCENTRATE 17.2 MG: 8.6 TABLET ORAL at 09:31

## 2023-11-07 RX ADMIN — SODIUM CHLORIDE, PRESERVATIVE FREE 10 ML: 5 INJECTION INTRAVENOUS at 09:32

## 2023-11-07 RX ADMIN — POLYETHYLENE GLYCOL 3350 17 G: 17 POWDER, FOR SOLUTION ORAL at 09:31

## 2023-11-07 RX ADMIN — SODIUM CHLORIDE, PRESERVATIVE FREE 40 MG: 5 INJECTION INTRAVENOUS at 05:11

## 2023-11-07 RX ADMIN — ENOXAPARIN SODIUM 30 MG: 100 INJECTION SUBCUTANEOUS at 09:31

## 2023-11-07 RX ADMIN — MEROPENEM 1000 MG: 1 INJECTION, POWDER, FOR SOLUTION INTRAVENOUS at 03:31

## 2023-11-07 NOTE — DISCHARGE INSTRUCTIONS
You were treated for difficulty swallowing. You had a stricture that was dilated. You were also found to have a urinary tract infection requiring IV antibiotics. Please finish this course with the IV line that is inserted. One of your kidneys was mildly, asymptomatically enlarged and there was a nonobstructing kidney stone. Please follow up with your Urologist at [facility] as well as your primary care doctor.

## 2023-11-07 NOTE — PROGRESS NOTES
Bedside and Verbal shift change report given to EULALIO Rowland (oncoming nurse) by Pearla Sandhoff (offgoing nurse). Report included the following information Nurse Handoff Report and MAR.

## 2023-11-07 NOTE — DISCHARGE SUMMARY
Nurse attempted to call and give report to accepting nurse, phone continued to ring, no answer. Will try again shortly.

## 2023-11-07 NOTE — CARE COORDINATION
Transition of Care Plan:    RUR: 13%  Prior Level of Functioning: LTC- needs assistance   Disposition: return to [facility] and Rehab   If SNF or IPR: Date FOC offered: N/A- LTC/VA contract in place   Follow up appointments: specialists as indicated   DME needed: N/A  Transportation at discharge: [facility] ETA 2:30PM (delayed)  IM/IMM Medicare/ letter given: given   Is patient a [place] and connected with VA? yes   If yes, was [employer] transfer form completed and VA notified? yes  Caregiver Contact: Radha Pollard (sister)  Discharge Caregiver contacted prior to discharge? Pt declined CM contact to family at this time  Care Conference needed? No  Barriers to discharge:  transportation- awaiting [facility] approval       11/07/23 [address] [place] [address] Discharge   Transition of Care Consult (CM Consult) Discharge Planning   Services At/After Discharge Long term care;IV Therapy;Transport    Resource Information Provided? Yes   Mode of Transport at Discharge BLS  ([facility]- delayed until 2:30PM)   Hospital Transport Time of Discharge 1430   Confirm Follow Up Transport Wheelchair [employer]   Condition of Participation: Discharge Planning   The Plan for Transition of Care is related to the following treatment goals: return to LTC at [facility] and Rehab under [employer]   The Patient and/or Patient Representative was provided with a Choice of Provider? Patient   The Patient and/Or Patient Representative agree with the Discharge Plan? Yes   Freedom of Choice list was provided with basic dialogue that supports the patient's individualized plan of care/goals, treatment preferences, and shares the quality data associated with the providers? Yes       Chart reviewed. CM aware of discharge order. Pt returning to [facility] Rehab under [place] contract for LTC placement. CM requested [facility] on 11/6 for this AM- 10AM . [facility] has pushed back  time three times as of now.  Escalating to leadership and awaiting [facility] Vacuum  [x][place] or Urinary Device  [x]PICC/Central Line  []Nebulizer  []Ventilator    Treatment:  [x]Isolation (for MRSA, VRE, etc.)  []Surgical Drain Management  []Tracheostomy Care  []Dressing Changes  []Dialysis with transportation  []PEG Care  []Oxygen  []Daily Weights for Heart Failure    Dietary:  []Any diet limitations  []Tube Feedings   []Total Parenteral Management (TPN)    Financial Resources:  []Medicaid Application Completed    []UAI Completed and copy given to pt/family  and copy given to pt/family  []A screening has previously been completed. []Level II Completed    [] Private pay individual who will not become   financially eligible for Medicaid within 6 months from admission to a [address] facility. [] Individual refused to have screening conducted. []Medicaid Application Completed    []The screening denied because it was determined individual did not need/did not qualify for nursing facility level of care. [] Out of state residents seeking direct admission to a [facility]. [x] Individuals who are inpatients of an out of state hospital, or in state or out of state veterans/ hospital and seek direct admission to a [address]   [] Individuals who are pateints or residents of a state owned/operated facility that is licensed by Department of [employer] ([employer]) and seek direct admission to [address] [facility] [address]  [] A screening not required for enrollment in [address] [place] [address] services as set out in [address] [place] [address] 30-  [] [facility]) staff shall perform screenings of the [facility] clients. Advanced Care Plan:  []Surrogate Decision Maker of Care  [x]POA  [x]Communicated Code Status and copy sent.     Other:          PEPE Richardson   Care Manager, [facility]  [phone]

## 2023-11-08 NOTE — DISCHARGE SUMMARY
Discharge Summary    Name: Wes Sena  [id number]  YOB: 1955 (Age: 77 y.o.)   Date of Admission: 11/1/2023  Date of Discharge: 11/7/2023  Attending Physician: No att. providers found    Discharge Diagnosis:     Esophageal Dysphagia  UTI - ESBL Klebsiella, Proteus  Moderate L hydronephrosis  Sacral Decubitus Wounds POA  Constipation    Consultations:  IP CONSULT TO GI  IP WOUND CARE NURSE CONSULT TO EVAL  IP CONSULT TO UROLOGY  IP CONSULT TO INFECTIOUS DISEASES  IP CONSULT TO CASE MANAGEMENT      Brief Admission History/Reason for Admission Per Marylee Ache, MD:     Wes Sena is a 77 y.o.  male with PMHx significant for right AKA, and urostomy who presented on Wednesday from [facility] with chief complaint inability to keep food or liquids down. He reported his emesis started on Tuesday when he noticed he could not keep any food down and then on Wednesday he was unable to keep any water down without vomiting. He stated that the vomiting only occurred when he tried to eat or drink anything. He stated that all water or food that he has tried to ingest in the past 24 hours \"gets caught in his chest\" which resulted in him having to spit the items out. He had the urge for a cold Pepsi and upon drinking it reported that he felt like it \"got stuck in his chest\" and then \"came back up\". Of note, patient was recently seen in the ED on 10/26/2023 for complaints of chest pain and thought he was having a heart attack. By the time he arrived in the emergency department his chest pain had resolved. He currently denies chest pain, shortness of breath, palpitations, headache, nausea or vomiting. In the ED, EKG, urinalysis and culture obtained. Laboratory values obtained include: CMP, CBC. Patient received 1 L LR bolus, 1 dose sodium bicarb-citric acid-simethicone packet (EZ Gas II) and 2 mg glucagon injection x1 dose.      We were asked to admit for work up and evaluation of the above problems. Brief Hospital Course by Main Problems:     Esophageal Dysphagia  Patient with 2-day history of emesis following ingestion of food or liquid without associated nausea or chest pain; patient reported food or liquid feels like it \"gets stuck in his chest\"  EZ Gas II and glucagon tablet not tolerated   Etiology esophageal stricture versus impaction  GI has been consulted, underwent EGD 11/2, showed stricture that was dilated  - advanced diet, tolerating     UTI - ESBL Klebsiella, Proteus  Moderate L hydronephrosis  Patient with urostomy; per patient, [facility]rostomy [facility] at [facility] in 2021 due to urinary retention  Urinalysis showed 2+ bacteria, large amount leukocyte esterase  Ceftriaxone 11/2 - 11/4  - sensi's noted, started meropenem, will need 1 week  - CT abd showing moderate L hydronephrosis with nonobstructing stone  - consulted Urology given hydronephrosis to determine if he requires any intervention or followup, appreciate assistance  - followup with [facility]  -  PICC line    Antimicrobial orders for discharge  -Meropenem 1 g   IV every 8 hours  end date 11/14  -Pull line at end of therapy.    -Weekly CBC, CMP-on Thursdays  -Fax reports to [phone], call with critical labs  at [phone]/ [phone]  -Encourage adequate fluids, daily probiotic/yogurt  -If line malfunction occurs and home health cannot reposition  please send patient to ED immediately  -ID follow-up -no follow-up required  If persistent side effects occur stop antibiotic and call-ID                  Sacral Decubitus Wounds POA  Mostly healed; small areas with skin tearing and with mild bleeding  Wound consultation appreciated     Constipation  - added scheduled miralax and senna, was disimpacted earlier in admission    Discharge Exam:  Patient seen and examined by me on discharge day.   Pertinent Findings:  Patient Vitals for the past 24 hrs:   BP Temp Temp src Pulse Resp SpO2   11/07/23

## 2025-06-27 ENCOUNTER — APPOINTMENT (OUTPATIENT)
Facility: HOSPITAL | Age: 70
DRG: 871 | End: 2025-06-27
Payer: MEDICARE

## 2025-06-27 ENCOUNTER — HOSPITAL ENCOUNTER (INPATIENT)
Facility: HOSPITAL | Age: 70
LOS: 3 days | Discharge: NURSING, REHAB OR LONG-TERM CARE | DRG: 871 | End: 2025-07-01
Attending: STUDENT IN AN ORGANIZED HEALTH CARE EDUCATION/TRAINING PROGRAM | Admitting: STUDENT IN AN ORGANIZED HEALTH CARE EDUCATION/TRAINING PROGRAM
Payer: MEDICARE

## 2025-06-27 DIAGNOSIS — J18.9 COMMUNITY ACQUIRED PNEUMONIA OF LEFT LOWER LOBE OF LUNG: ICD-10-CM

## 2025-06-27 DIAGNOSIS — N30.00 ACUTE CYSTITIS WITHOUT HEMATURIA: ICD-10-CM

## 2025-06-27 DIAGNOSIS — A41.9 SEPSIS, DUE TO UNSPECIFIED ORGANISM, UNSPECIFIED WHETHER ACUTE ORGAN DYSFUNCTION PRESENT (HCC): Primary | ICD-10-CM

## 2025-06-27 LAB
ABO + RH BLD: NORMAL
ALBUMIN SERPL-MCNC: 2.7 G/DL (ref 3.5–5)
ALBUMIN/GLOB SERPL: 0.5 (ref 1.1–2.2)
ALP SERPL-CCNC: 105 U/L (ref 45–117)
ALT SERPL-CCNC: 28 U/L (ref 12–78)
ANION GAP SERPL CALC-SCNC: 4 MMOL/L (ref 2–12)
APPEARANCE UR: ABNORMAL
AST SERPL-CCNC: 13 U/L (ref 15–37)
BACTERIA URNS QL MICRO: ABNORMAL /HPF
BASOPHILS # BLD: 0.02 K/UL (ref 0–0.1)
BASOPHILS NFR BLD: 0.2 % (ref 0–1)
BILIRUB SERPL-MCNC: 1.1 MG/DL (ref 0.2–1)
BILIRUB UR QL: NEGATIVE
BLOOD GROUP ANTIBODIES SERPL: NORMAL
BUN SERPL-MCNC: 11 MG/DL (ref 6–20)
BUN/CREAT SERPL: 14 (ref 12–20)
CALCIUM SERPL-MCNC: 8.9 MG/DL (ref 8.5–10.1)
CHLORIDE SERPL-SCNC: 106 MMOL/L (ref 97–108)
CO2 SERPL-SCNC: 27 MMOL/L (ref 21–32)
COLOR UR: ABNORMAL
CREAT SERPL-MCNC: 0.79 MG/DL (ref 0.7–1.3)
DIFFERENTIAL METHOD BLD: ABNORMAL
EOSINOPHIL # BLD: 0.07 K/UL (ref 0–0.4)
EOSINOPHIL NFR BLD: 0.7 % (ref 0–7)
EPITH CASTS URNS QL MICRO: ABNORMAL /LPF
ERYTHROCYTE [DISTWIDTH] IN BLOOD BY AUTOMATED COUNT: 13.6 % (ref 11.5–14.5)
FLUAV RNA SPEC QL NAA+PROBE: NOT DETECTED
FLUBV RNA SPEC QL NAA+PROBE: NOT DETECTED
GLOBULIN SER CALC-MCNC: 5.8 G/DL (ref 2–4)
GLUCOSE SERPL-MCNC: 109 MG/DL (ref 65–100)
GLUCOSE UR STRIP.AUTO-MCNC: NEGATIVE MG/DL
HCT VFR BLD AUTO: 38.6 % (ref 36.6–50.3)
HGB BLD-MCNC: 12.8 G/DL (ref 12.1–17)
HGB UR QL STRIP: NEGATIVE
IMM GRANULOCYTES # BLD AUTO: 0.12 K/UL (ref 0–0.04)
IMM GRANULOCYTES NFR BLD AUTO: 1.2 % (ref 0–0.5)
KETONES UR QL STRIP.AUTO: NEGATIVE MG/DL
LACTATE BLD-SCNC: 1.9 MMOL/L (ref 0.4–2)
LEUKOCYTE ESTERASE UR QL STRIP.AUTO: ABNORMAL
LYMPHOCYTES # BLD: 1.11 K/UL (ref 0.8–3.5)
LYMPHOCYTES NFR BLD: 11.3 % (ref 12–49)
MCH RBC QN AUTO: 29.2 PG (ref 26–34)
MCHC RBC AUTO-ENTMCNC: 33.2 G/DL (ref 30–36.5)
MCV RBC AUTO: 88.1 FL (ref 80–99)
MONOCYTES # BLD: 0.97 K/UL (ref 0–1)
MONOCYTES NFR BLD: 9.9 % (ref 5–13)
NEUTS SEG # BLD: 7.49 K/UL (ref 1.8–8)
NEUTS SEG NFR BLD: 76.7 % (ref 32–75)
NITRITE UR QL STRIP.AUTO: NEGATIVE
NRBC # BLD: 0 K/UL (ref 0–0.01)
NRBC BLD-RTO: 0 PER 100 WBC
PH UR STRIP: 8 (ref 5–8)
PLATELET # BLD AUTO: 239 K/UL (ref 150–400)
PMV BLD AUTO: 11.3 FL (ref 8.9–12.9)
POTASSIUM SERPL-SCNC: 4.1 MMOL/L (ref 3.5–5.1)
PROT SERPL-MCNC: 8.5 G/DL (ref 6.4–8.2)
PROT UR STRIP-MCNC: 30 MG/DL
RBC # BLD AUTO: 4.38 M/UL (ref 4.1–5.7)
RBC #/AREA URNS HPF: ABNORMAL /HPF (ref 0–5)
SARS-COV-2 RNA RESP QL NAA+PROBE: NOT DETECTED
SODIUM SERPL-SCNC: 137 MMOL/L (ref 136–145)
SOURCE: NORMAL
SP GR UR REFRACTOMETRY: 1.01
SPECIMEN EXP DATE BLD: NORMAL
URINE CULTURE IF INDICATED: ABNORMAL
UROBILINOGEN UR QL STRIP.AUTO: 1 EU/DL (ref 0.2–1)
WBC # BLD AUTO: 9.8 K/UL (ref 4.1–11.1)
WBC URNS QL MICRO: ABNORMAL /HPF (ref 0–4)

## 2025-06-27 PROCEDURE — 87186 SC STD MICRODIL/AGAR DIL: CPT

## 2025-06-27 PROCEDURE — 96361 HYDRATE IV INFUSION ADD-ON: CPT

## 2025-06-27 PROCEDURE — 80053 COMPREHEN METABOLIC PANEL: CPT

## 2025-06-27 PROCEDURE — 71046 X-RAY EXAM CHEST 2 VIEWS: CPT

## 2025-06-27 PROCEDURE — 99285 EMERGENCY DEPT VISIT HI MDM: CPT

## 2025-06-27 PROCEDURE — 93005 ELECTROCARDIOGRAM TRACING: CPT | Performed by: STUDENT IN AN ORGANIZED HEALTH CARE EDUCATION/TRAINING PROGRAM

## 2025-06-27 PROCEDURE — 96365 THER/PROPH/DIAG IV INF INIT: CPT

## 2025-06-27 PROCEDURE — 87636 SARSCOV2 & INF A&B AMP PRB: CPT

## 2025-06-27 PROCEDURE — 6360000002 HC RX W HCPCS: Performed by: STUDENT IN AN ORGANIZED HEALTH CARE EDUCATION/TRAINING PROGRAM

## 2025-06-27 PROCEDURE — 36415 COLL VENOUS BLD VENIPUNCTURE: CPT

## 2025-06-27 PROCEDURE — 2580000003 HC RX 258: Performed by: STUDENT IN AN ORGANIZED HEALTH CARE EDUCATION/TRAINING PROGRAM

## 2025-06-27 PROCEDURE — 86901 BLOOD TYPING SEROLOGIC RH(D): CPT

## 2025-06-27 PROCEDURE — 96375 TX/PRO/DX INJ NEW DRUG ADDON: CPT

## 2025-06-27 PROCEDURE — 74176 CT ABD & PELVIS W/O CONTRAST: CPT

## 2025-06-27 PROCEDURE — 87086 URINE CULTURE/COLONY COUNT: CPT

## 2025-06-27 PROCEDURE — 2500000003 HC RX 250 WO HCPCS: Performed by: STUDENT IN AN ORGANIZED HEALTH CARE EDUCATION/TRAINING PROGRAM

## 2025-06-27 PROCEDURE — 81001 URINALYSIS AUTO W/SCOPE: CPT

## 2025-06-27 PROCEDURE — 86900 BLOOD TYPING SEROLOGIC ABO: CPT

## 2025-06-27 PROCEDURE — 87040 BLOOD CULTURE FOR BACTERIA: CPT

## 2025-06-27 PROCEDURE — 71045 X-RAY EXAM CHEST 1 VIEW: CPT

## 2025-06-27 PROCEDURE — 83605 ASSAY OF LACTIC ACID: CPT

## 2025-06-27 PROCEDURE — 85025 COMPLETE CBC W/AUTO DIFF WBC: CPT

## 2025-06-27 PROCEDURE — 86850 RBC ANTIBODY SCREEN: CPT

## 2025-06-27 PROCEDURE — 87088 URINE BACTERIA CULTURE: CPT

## 2025-06-27 RX ORDER — 0.9 % SODIUM CHLORIDE 0.9 %
1000 INTRAVENOUS SOLUTION INTRAVENOUS ONCE
Status: COMPLETED | OUTPATIENT
Start: 2025-06-27 | End: 2025-06-27

## 2025-06-27 RX ADMIN — WATER 1000 MG: 1 INJECTION INTRAMUSCULAR; INTRAVENOUS; SUBCUTANEOUS at 19:44

## 2025-06-27 RX ADMIN — MEROPENEM 1000 MG: 1 INJECTION INTRAVENOUS at 22:41

## 2025-06-27 RX ADMIN — PANTOPRAZOLE SODIUM 40 MG: 40 INJECTION, POWDER, FOR SOLUTION INTRAVENOUS at 19:43

## 2025-06-27 RX ADMIN — SODIUM CHLORIDE 1000 ML: 0.9 INJECTION, SOLUTION INTRAVENOUS at 19:48

## 2025-06-27 NOTE — ED NOTES
Report given to LYNN Arreola. Nurse was informed of reason for arrival, vitals, labs, medications, orders, procedures, results, anything left pending and current plan of action. Questions were asked and received prior to departure from the patient. Pt resting in bed on monitor x3 with call bell in reach.

## 2025-06-27 NOTE — ED PROVIDER NOTES
[facility] 3 SURG TELE  EMERGENCY DEPARTMENT ENCOUNTER       Pt Name: Giovanni Bojorquez  MRN: [id number]  Birthdate 1955  Date of evaluation: 6/27/2025  Provider: Buffy Almanzar DO   PCP: No primary care provider on file.  Note Started: 12:52 AM 6/27/25     CHIEF COMPLAINT       Chief Complaint   Patient presents with    Hematemesis     [facility] about the last week. Denies blood in stool or abd pain, not on thinners. Has urostomy (last changed 1 wk ago), and chronic osteomyelitis. Presenting febrile and tachy, Aox4 on arrival        HISTORY OF PRESENT ILLNESS: 1 or more elements      History From: Patient  None     Giovanni Bojorquez is a 70 y.o. male who presents with chief complaint of coffee ground emesis for the past last week. Denies any blood in stool or abdominal pain. Denies any blood thinners. Has a urostomy, last changed 1 week ago, chronic osteo.  He denies any other complaints except feeling generally unwell.     Nursing Notes were all reviewed and agreed with or any disagreements were addressed in the HPI.       PAST HISTORY     Past Medical History:  Past Medical History:   Diagnosis Date    DVT (deep venous thrombosis) (HCC)     Sleep apnea     no cpap         Past Surgical History:  Past Surgical History:   Procedure Laterality Date    CERVICAL SPINE SURGERY  1984    LEG AMPUTATION BELOW KNEE Right     ORTHOPEDIC SURGERY Bilateral     metal rods in bilateral femurs    UPPER GASTROINTESTINAL ENDOSCOPY N/A 11/2/2023    EGD ESOPHAGOGASTRODUODENOSCOPY performed by Jagdeep Sanchez MD at [facility] ENDOSCOPY    UROLOGICAL SURGERY      urostomy       Family History:  No family history on file.    Social History:  Social History     Tobacco Use    Smoking status: Former     Types: Cigarettes    Smokeless tobacco: Never   Substance Use Topics    Alcohol use: Not Currently     Comment: quit in 2012    Drug use: Not Currently     Types: Marijuana (Weed)       Allergies:  Allergies

## 2025-06-28 PROBLEM — A41.9 SEPSIS (HCC): Status: ACTIVE | Noted: 2025-06-28

## 2025-06-28 LAB
APPEARANCE UR: ABNORMAL
BACTERIA URNS QL MICRO: ABNORMAL /HPF
BILIRUB UR QL: NEGATIVE
COLOR UR: ABNORMAL
EPITH CASTS URNS QL MICRO: ABNORMAL /LPF
GLUCOSE UR STRIP.AUTO-MCNC: NEGATIVE MG/DL
HGB UR QL STRIP: ABNORMAL
KETONES UR QL STRIP.AUTO: NEGATIVE MG/DL
LEUKOCYTE ESTERASE UR QL STRIP.AUTO: ABNORMAL
NITRITE UR QL STRIP.AUTO: POSITIVE
PH UR STRIP: 7.5 (ref 5–8)
PROCALCITONIN SERPL-MCNC: 4.7 NG/ML
PROT UR STRIP-MCNC: 30 MG/DL
RBC #/AREA URNS HPF: ABNORMAL /HPF (ref 0–5)
SP GR UR REFRACTOMETRY: 1.01
URINE CULTURE IF INDICATED: ABNORMAL
UROBILINOGEN UR QL STRIP.AUTO: 1 EU/DL (ref 0.2–1)
WBC URNS QL MICRO: ABNORMAL /HPF (ref 0–4)

## 2025-06-28 PROCEDURE — 2580000003 HC RX 258: Performed by: STUDENT IN AN ORGANIZED HEALTH CARE EDUCATION/TRAINING PROGRAM

## 2025-06-28 PROCEDURE — 6360000002 HC RX W HCPCS: Performed by: STUDENT IN AN ORGANIZED HEALTH CARE EDUCATION/TRAINING PROGRAM

## 2025-06-28 PROCEDURE — 84145 PROCALCITONIN (PCT): CPT

## 2025-06-28 PROCEDURE — 1100000003 HC PRIVATE W/ TELEMETRY

## 2025-06-28 PROCEDURE — 86738 MYCOPLASMA ANTIBODY: CPT

## 2025-06-28 PROCEDURE — 05HF33Z INSERTION OF INFUSION DEVICE INTO LEFT CEPHALIC VEIN, PERCUTANEOUS APPROACH: ICD-10-PCS | Performed by: HOSPITALIST

## 2025-06-28 PROCEDURE — 81001 URINALYSIS AUTO W/SCOPE: CPT

## 2025-06-28 PROCEDURE — 2500000003 HC RX 250 WO HCPCS: Performed by: STUDENT IN AN ORGANIZED HEALTH CARE EDUCATION/TRAINING PROGRAM

## 2025-06-28 PROCEDURE — 36410 VNPNXR 3YR/> PHY/QHP DX/THER: CPT

## 2025-06-28 PROCEDURE — 87899 AGENT NOS ASSAY W/OPTIC: CPT

## 2025-06-28 PROCEDURE — 87086 URINE CULTURE/COLONY COUNT: CPT

## 2025-06-28 PROCEDURE — 36415 COLL VENOUS BLD VENIPUNCTURE: CPT

## 2025-06-28 PROCEDURE — 2500000003 HC RX 250 WO HCPCS: Performed by: HOSPITALIST

## 2025-06-28 PROCEDURE — 87449 NOS EACH ORGANISM AG IA: CPT

## 2025-06-28 RX ORDER — ONDANSETRON 2 MG/ML
4 INJECTION INTRAMUSCULAR; INTRAVENOUS EVERY 6 HOURS PRN
Status: DISCONTINUED | OUTPATIENT
Start: 2025-06-28 | End: 2025-07-01 | Stop reason: HOSPADM

## 2025-06-28 RX ORDER — SODIUM CHLORIDE 0.9 % (FLUSH) 0.9 %
5-40 SYRINGE (ML) INJECTION PRN
Status: DISCONTINUED | OUTPATIENT
Start: 2025-06-28 | End: 2025-06-29 | Stop reason: SDUPTHER

## 2025-06-28 RX ORDER — SODIUM CHLORIDE 0.9 % (FLUSH) 0.9 %
5-40 SYRINGE (ML) INJECTION EVERY 12 HOURS SCHEDULED
Status: DISCONTINUED | OUTPATIENT
Start: 2025-06-28 | End: 2025-06-29 | Stop reason: SDUPTHER

## 2025-06-28 RX ORDER — ACETAMINOPHEN 650 MG/1
650 SUPPOSITORY RECTAL EVERY 6 HOURS PRN
Status: DISCONTINUED | OUTPATIENT
Start: 2025-06-28 | End: 2025-07-01 | Stop reason: HOSPADM

## 2025-06-28 RX ORDER — ENOXAPARIN SODIUM 100 MG/ML
40 INJECTION SUBCUTANEOUS DAILY
Status: DISCONTINUED | OUTPATIENT
Start: 2025-06-28 | End: 2025-06-28

## 2025-06-28 RX ORDER — SODIUM CHLORIDE 9 MG/ML
INJECTION, SOLUTION INTRAVENOUS PRN
Status: DISCONTINUED | OUTPATIENT
Start: 2025-06-28 | End: 2025-07-01 | Stop reason: HOSPADM

## 2025-06-28 RX ORDER — ACETAMINOPHEN 325 MG/1
650 TABLET ORAL EVERY 6 HOURS PRN
Status: DISCONTINUED | OUTPATIENT
Start: 2025-06-28 | End: 2025-07-01 | Stop reason: HOSPADM

## 2025-06-28 RX ORDER — SODIUM CHLORIDE 0.9 % (FLUSH) 0.9 %
5-40 SYRINGE (ML) INJECTION PRN
Status: DISCONTINUED | OUTPATIENT
Start: 2025-06-28 | End: 2025-07-01 | Stop reason: HOSPADM

## 2025-06-28 RX ORDER — PANTOPRAZOLE SODIUM 40 MG/1
40 TABLET, DELAYED RELEASE ORAL 2 TIMES DAILY
Status: DISCONTINUED | OUTPATIENT
Start: 2025-06-28 | End: 2025-06-28

## 2025-06-28 RX ORDER — SODIUM CHLORIDE 0.9 % (FLUSH) 0.9 %
5-40 SYRINGE (ML) INJECTION EVERY 12 HOURS SCHEDULED
Status: DISCONTINUED | OUTPATIENT
Start: 2025-06-28 | End: 2025-07-01 | Stop reason: HOSPADM

## 2025-06-28 RX ORDER — SODIUM CHLORIDE, SODIUM LACTATE, POTASSIUM CHLORIDE, AND CALCIUM CHLORIDE .6; .31; .03; .02 G/100ML; G/100ML; G/100ML; G/100ML
500 INJECTION, SOLUTION INTRAVENOUS ONCE
Status: COMPLETED | OUTPATIENT
Start: 2025-06-28 | End: 2025-06-28

## 2025-06-28 RX ADMIN — SODIUM CHLORIDE, SODIUM LACTATE, POTASSIUM CHLORIDE, AND CALCIUM CHLORIDE 500 ML: .6; .31; .03; .02 INJECTION, SOLUTION INTRAVENOUS at 01:12

## 2025-06-28 RX ADMIN — SODIUM CHLORIDE, PRESERVATIVE FREE 10 ML: 5 INJECTION INTRAVENOUS at 21:14

## 2025-06-28 RX ADMIN — SODIUM CHLORIDE, PRESERVATIVE FREE 40 MG: 5 INJECTION INTRAVENOUS at 21:13

## 2025-06-28 RX ADMIN — MEROPENEM 1000 MG: 1 INJECTION INTRAVENOUS at 08:26

## 2025-06-28 RX ADMIN — AZITHROMYCIN MONOHYDRATE 500 MG: 500 INJECTION, POWDER, LYOPHILIZED, FOR SOLUTION INTRAVENOUS at 01:11

## 2025-06-28 RX ADMIN — SODIUM CHLORIDE, PRESERVATIVE FREE 10 ML: 5 INJECTION INTRAVENOUS at 21:13

## 2025-06-28 RX ADMIN — SODIUM CHLORIDE, PRESERVATIVE FREE 40 MG: 5 INJECTION INTRAVENOUS at 08:20

## 2025-06-28 ASSESSMENT — PAIN SCALES - GENERAL
PAINLEVEL_OUTOF10: 0

## 2025-06-28 NOTE — PLAN OF CARE
Problem: Neurosensory - Adult  Goal: Achieves stable or improved neurological status  Outcome: Progressing     Problem: Respiratory - Adult  Goal: Achieves optimal ventilation and oxygenation  Outcome: Progressing     Problem: Cardiovascular - Adult  Goal: Absence of cardiac dysrhythmias or at baseline  Outcome: Progressing     Problem: Skin/Tissue Integrity - Adult  Goal: Oral mucous membranes remain intact  Outcome: Progressing     Problem: Gastrointestinal - Adult  Goal: Minimal or absence of nausea and vomiting  Outcome: Progressing

## 2025-06-28 NOTE — PROCEDURES
Midline Insertion Procedure Note    Procedure: Insertion of #4 FR/18G Midline    Indications:  Poor Access, Pt./Dr. Preference    Procedure Details    Risks of hemorrhage, and adverse drug reaction were discussed.  Vessel assessment revealed compressible well defined vessels.  Multiple sticks noted to BUE prior to PICC RN arrival. UE Midline placed without complication for patient.  2ml of Lidocaine 1% administered via infiltration prior to Midline insertion.  Time-Out performed at bedside with LYNN bales.      #4 FR/18G Midline inserted to the L Basilic vein per hospital protocol.   Blood return:  yes    Catheter length 12 cm, with 0 cm exposed. Mid upper arm circumference is 30 cm.   Catheter was flushed with 20 cc NS. Patient did tolerate procedure well. Upon completion of procedure, all MIDLINE kit components accounted for and intact.  Post Procedure hand-off given to LYNN bales.      Midline Brochure given to patient with teaching instruction. Bed returned to locked position with call bell in reach. PROCEDURE NOTE  Date: 6/28/2025   Name: Giovanni Bojorquez  YOB: 1955    Procedures

## 2025-06-28 NOTE — H&P
Hospitalist Admission Note    NAME:Giovanni Bojorquez   : 1955   MRN: [id number]     Date/Time: 2025 12:46 AM    Patient PCP: No primary care provider on file.    *Please be aware this note is formulated with assistance from Dragon voice-recognition dictation software. Please excuse any errors that may be present*    ______________________________________________________________________  Given the patient's current clinical presentation, I have a high level of concern for decompensation if discharged from the emergency department.  Complex decision making was performed, which includes reviewing the patient's available past medical records, laboratory results, and x-ray films.       My assessment of this patient's clinical condition and my plan of care is as follows.    Problem List:  Patient Active Problem List   Diagnosis    Dysphagia    Urinary tract infection due to ESBL Klebsiella    Urinary tract infection due to Proteus    History of urostomy    Right BKA infection (HCC)    Pressure injury of skin of sacral region    Sepsis ([facility])         Assessment / Plan:    Sepsis - community acquired pneumonia vs UTI   History of chronic urostomy   Presented with onset of nausea and vomiting. Febrile upon arrival in the Ed  Chest imaging with concern for nodular airspace opacity   CT a/p today showing ileal conduit with chronic left hydronephrosis and bilateral punctate nonobstructive nephrolithiasis   - Continue IV abx - Meropenem and Azithromycin  - Noted history of ESBL UTI as below   - Check urine strep, urine legionella, mycoplasma  - Check procalcitonin   - Per last Urology notes chronic hydronephrosis likely chronic reflux, no  intervention   - S/p meropenem in the ED. UA with concern for UTI - discussed with nursing; this was sent from old urostomy bag (dated  on patch today). Will ask nursing to exchange bag and send new urine sample. History of ESBL klebsiella and proteus - continue empiric

## 2025-06-28 NOTE — CONSULTS
GI Consultation Note (Daniel)    NAME: Giovanni Bojorquez : 1955 MRN: [id number]   ATTG: James Bello MD PCP: No primary care provider on file.  Date/Time:  2025 9:26 AM  Subjective:   REASON FOR CONSULT:      Giovanni is a 70 y.o.   male with alcohol abuse, quadriplegia complicated by sacral pressure injuries, chronic osteomyelitis, DVT, sleep apnea, HTN, emphysema, neurogenic bladder requiring nephrostomy tube w/ hx of ESBL klebsiella and proteus, severe erosive esophagitis with Candida in , and known gastritis, esophagitis, and distal esophageal stricture dilated in 2023.  who I was asked to see for hematemesis.  [facility] 1 wk hx recurrent dark coffee ground emesis.  He denies Nausea, abdominal pain, BRBPR, melena, diarrhea, constipation, or change in bowel pattern.  He notes intermittent dysphagia  with solids only. Recent admission at [facility] with bleb like blisters about RUE IV site.   IN ER noted to be tachycardic and febrile. Labs noted normal CBC, Nl BUN:Cr, and CT without obvious GI mass or inflammation, limited by lack of contrast.  Treated with IV PPI and meropenem in the ED.      GI hx notable for presentation to [facility]  with hematemesis w/ EGD notable for grade D esophagitis and candidiasis with some food in his stomach. His colonoscopy prep was rather poor but adequate to rule out large lesions (malignancy). He was again admitted at [facility] 2023 for vomiting with concern for coffee-ground emesis but endoscopy was deferred as no active bleeding noted.  He was last seen for GI issues here at [facility] 2023 with c/o dysphagia at which time EGD  completed 2023 noting:  -Distal thin circumferential esophageal stricture at 35cm disrupted with endoscope and readily traversed; this is biopsied (confirmed as esophagitis) and then dilated with 15mm balloon, held for 1 minute and evaluated noting small nonbleeding

## 2025-06-29 LAB
ALBUMIN SERPL-MCNC: 2.3 G/DL (ref 3.5–5)
ALBUMIN/GLOB SERPL: 0.5 (ref 1.1–2.2)
ALP SERPL-CCNC: 76 U/L (ref 45–117)
ALT SERPL-CCNC: 22 U/L (ref 12–78)
ANION GAP SERPL CALC-SCNC: 6 MMOL/L (ref 2–12)
AST SERPL-CCNC: 15 U/L (ref 15–37)
BASOPHILS # BLD: 0.04 K/UL (ref 0–0.1)
BASOPHILS NFR BLD: 0.5 % (ref 0–1)
BILIRUB SERPL-MCNC: 0.6 MG/DL (ref 0.2–1)
BUN SERPL-MCNC: 11 MG/DL (ref 6–20)
BUN/CREAT SERPL: 15 (ref 12–20)
CALCIUM SERPL-MCNC: 8.3 MG/DL (ref 8.5–10.1)
CHLORIDE SERPL-SCNC: 108 MMOL/L (ref 97–108)
CO2 SERPL-SCNC: 26 MMOL/L (ref 21–32)
CREAT SERPL-MCNC: 0.71 MG/DL (ref 0.7–1.3)
DIFFERENTIAL METHOD BLD: ABNORMAL
EKG ATRIAL RATE: 103 BPM
EKG DIAGNOSIS: NORMAL
EKG P AXIS: 61 DEGREES
EKG P-R INTERVAL: 126 MS
EKG Q-T INTERVAL: 306 MS
EKG QRS DURATION: 80 MS
EKG QTC CALCULATION (BAZETT): 400 MS
EKG R AXIS: 34 DEGREES
EKG T AXIS: 19 DEGREES
EKG VENTRICULAR RATE: 103 BPM
EOSINOPHIL # BLD: 0.66 K/UL (ref 0–0.4)
EOSINOPHIL NFR BLD: 8.6 % (ref 0–7)
ERYTHROCYTE [DISTWIDTH] IN BLOOD BY AUTOMATED COUNT: 13.8 % (ref 11.5–14.5)
GLOBULIN SER CALC-MCNC: 4.8 G/DL (ref 2–4)
GLUCOSE SERPL-MCNC: 84 MG/DL (ref 65–100)
HCT VFR BLD AUTO: 28.8 % (ref 36.6–50.3)
HGB BLD-MCNC: 9.4 G/DL (ref 12.1–17)
IMM GRANULOCYTES # BLD AUTO: 0.1 K/UL (ref 0–0.04)
IMM GRANULOCYTES NFR BLD AUTO: 1.3 % (ref 0–0.5)
LYMPHOCYTES # BLD: 1.5 K/UL (ref 0.8–3.5)
LYMPHOCYTES NFR BLD: 19.6 % (ref 12–49)
MCH RBC QN AUTO: 28.8 PG (ref 26–34)
MCHC RBC AUTO-ENTMCNC: 32.6 G/DL (ref 30–36.5)
MCV RBC AUTO: 88.3 FL (ref 80–99)
MONOCYTES # BLD: 0.75 K/UL (ref 0–1)
MONOCYTES NFR BLD: 9.8 % (ref 5–13)
NEUTS SEG # BLD: 4.62 K/UL (ref 1.8–8)
NEUTS SEG NFR BLD: 60.2 % (ref 32–75)
NRBC # BLD: 0 K/UL (ref 0–0.01)
NRBC BLD-RTO: 0 PER 100 WBC
PLATELET # BLD AUTO: 213 K/UL (ref 150–400)
PMV BLD AUTO: 11 FL (ref 8.9–12.9)
POTASSIUM SERPL-SCNC: 3.8 MMOL/L (ref 3.5–5.1)
PROT SERPL-MCNC: 7.1 G/DL (ref 6.4–8.2)
RBC # BLD AUTO: 3.26 M/UL (ref 4.1–5.7)
SODIUM SERPL-SCNC: 140 MMOL/L (ref 136–145)
WBC # BLD AUTO: 7.7 K/UL (ref 4.1–11.1)

## 2025-06-29 PROCEDURE — 85025 COMPLETE CBC W/AUTO DIFF WBC: CPT

## 2025-06-29 PROCEDURE — 1100000003 HC PRIVATE W/ TELEMETRY

## 2025-06-29 PROCEDURE — 2500000003 HC RX 250 WO HCPCS: Performed by: HOSPITALIST

## 2025-06-29 PROCEDURE — 2580000003 HC RX 258: Performed by: STUDENT IN AN ORGANIZED HEALTH CARE EDUCATION/TRAINING PROGRAM

## 2025-06-29 PROCEDURE — 2500000003 HC RX 250 WO HCPCS: Performed by: STUDENT IN AN ORGANIZED HEALTH CARE EDUCATION/TRAINING PROGRAM

## 2025-06-29 PROCEDURE — 36415 COLL VENOUS BLD VENIPUNCTURE: CPT

## 2025-06-29 PROCEDURE — 6360000002 HC RX W HCPCS: Performed by: STUDENT IN AN ORGANIZED HEALTH CARE EDUCATION/TRAINING PROGRAM

## 2025-06-29 PROCEDURE — 80053 COMPREHEN METABOLIC PANEL: CPT

## 2025-06-29 RX ADMIN — SODIUM CHLORIDE, PRESERVATIVE FREE 40 MG: 5 INJECTION INTRAVENOUS at 20:16

## 2025-06-29 RX ADMIN — SODIUM CHLORIDE, PRESERVATIVE FREE 10 ML: 5 INJECTION INTRAVENOUS at 08:04

## 2025-06-29 RX ADMIN — MEROPENEM 1000 MG: 1 INJECTION INTRAVENOUS at 03:20

## 2025-06-29 RX ADMIN — MEROPENEM 1000 MG: 1 INJECTION INTRAVENOUS at 11:17

## 2025-06-29 RX ADMIN — AZITHROMYCIN MONOHYDRATE 500 MG: 500 INJECTION, POWDER, LYOPHILIZED, FOR SOLUTION INTRAVENOUS at 00:47

## 2025-06-29 RX ADMIN — SODIUM CHLORIDE, PRESERVATIVE FREE 10 ML: 5 INJECTION INTRAVENOUS at 20:16

## 2025-06-29 RX ADMIN — SODIUM CHLORIDE, PRESERVATIVE FREE 40 MG: 5 INJECTION INTRAVENOUS at 08:04

## 2025-06-29 RX ADMIN — MEROPENEM 1000 MG: 1 INJECTION INTRAVENOUS at 20:16

## 2025-06-29 ASSESSMENT — PAIN SCALES - GENERAL
PAINLEVEL_OUTOF10: 0

## 2025-06-29 NOTE — PLAN OF CARE
Noted drop in hgb. Otherwise stable. Plan for EGD Monday.   Please call with any questions/concerns.    Scott Suarez MD  [facility]

## 2025-06-30 ENCOUNTER — ANESTHESIA (OUTPATIENT)
Facility: HOSPITAL | Age: 70
DRG: 871 | End: 2025-06-30
Payer: MEDICARE

## 2025-06-30 ENCOUNTER — ANESTHESIA EVENT (OUTPATIENT)
Facility: HOSPITAL | Age: 70
DRG: 871 | End: 2025-06-30
Payer: MEDICARE

## 2025-06-30 LAB
ALBUMIN SERPL-MCNC: 2.2 G/DL (ref 3.5–5)
ALBUMIN/GLOB SERPL: 0.4 (ref 1.1–2.2)
ALP SERPL-CCNC: 86 U/L (ref 45–117)
ALT SERPL-CCNC: 37 U/L (ref 12–78)
ANION GAP SERPL CALC-SCNC: 5 MMOL/L (ref 2–12)
AST SERPL-CCNC: 39 U/L (ref 15–37)
BACTERIA SPEC CULT: ABNORMAL
BASOPHILS # BLD: 0.06 K/UL (ref 0–0.1)
BASOPHILS NFR BLD: 0.9 % (ref 0–1)
BILIRUB SERPL-MCNC: 0.9 MG/DL (ref 0.2–1)
BUN SERPL-MCNC: 6 MG/DL (ref 6–20)
BUN/CREAT SERPL: 10 (ref 12–20)
CALCIUM SERPL-MCNC: 8.4 MG/DL (ref 8.5–10.1)
CC UR VC: ABNORMAL
CHLORIDE SERPL-SCNC: 108 MMOL/L (ref 97–108)
CO2 SERPL-SCNC: 25 MMOL/L (ref 21–32)
CREAT SERPL-MCNC: 0.58 MG/DL (ref 0.7–1.3)
DIFFERENTIAL METHOD BLD: ABNORMAL
EOSINOPHIL # BLD: 0.77 K/UL (ref 0–0.4)
EOSINOPHIL NFR BLD: 11.4 % (ref 0–7)
ERYTHROCYTE [DISTWIDTH] IN BLOOD BY AUTOMATED COUNT: 13.8 % (ref 11.5–14.5)
GLOBULIN SER CALC-MCNC: 5.1 G/DL (ref 2–4)
GLUCOSE SERPL-MCNC: 83 MG/DL (ref 65–100)
HCT VFR BLD AUTO: 27.8 % (ref 36.6–50.3)
HGB BLD-MCNC: 9.3 G/DL (ref 12.1–17)
IMM GRANULOCYTES # BLD AUTO: 0.08 K/UL (ref 0–0.04)
IMM GRANULOCYTES NFR BLD AUTO: 1.2 % (ref 0–0.5)
LYMPHOCYTES # BLD: 1.34 K/UL (ref 0.8–3.5)
LYMPHOCYTES NFR BLD: 19.9 % (ref 12–49)
MCH RBC QN AUTO: 29.5 PG (ref 26–34)
MCHC RBC AUTO-ENTMCNC: 33.5 G/DL (ref 30–36.5)
MCV RBC AUTO: 88.3 FL (ref 80–99)
MONOCYTES # BLD: 0.71 K/UL (ref 0–1)
MONOCYTES NFR BLD: 10.5 % (ref 5–13)
NEUTS SEG # BLD: 3.79 K/UL (ref 1.8–8)
NEUTS SEG NFR BLD: 56.1 % (ref 32–75)
NRBC # BLD: 0 K/UL (ref 0–0.01)
NRBC BLD-RTO: 0 PER 100 WBC
PLATELET # BLD AUTO: 227 K/UL (ref 150–400)
PMV BLD AUTO: 10.6 FL (ref 8.9–12.9)
POTASSIUM SERPL-SCNC: 3.5 MMOL/L (ref 3.5–5.1)
PROT SERPL-MCNC: 7.3 G/DL (ref 6.4–8.2)
RBC # BLD AUTO: 3.15 M/UL (ref 4.1–5.7)
SERVICE CMNT-IMP: ABNORMAL
SODIUM SERPL-SCNC: 138 MMOL/L (ref 136–145)
WBC # BLD AUTO: 6.8 K/UL (ref 4.1–11.1)

## 2025-06-30 PROCEDURE — 2580000003 HC RX 258: Performed by: STUDENT IN AN ORGANIZED HEALTH CARE EDUCATION/TRAINING PROGRAM

## 2025-06-30 PROCEDURE — 88342 IMHCHEM/IMCYTCHM 1ST ANTB: CPT

## 2025-06-30 PROCEDURE — 85025 COMPLETE CBC W/AUTO DIFF WBC: CPT

## 2025-06-30 PROCEDURE — C1726 CATH, BAL DIL, NON-VASCULAR: HCPCS | Performed by: STUDENT IN AN ORGANIZED HEALTH CARE EDUCATION/TRAINING PROGRAM

## 2025-06-30 PROCEDURE — 3700000001 HC ADD 15 MINUTES (ANESTHESIA): Performed by: STUDENT IN AN ORGANIZED HEALTH CARE EDUCATION/TRAINING PROGRAM

## 2025-06-30 PROCEDURE — 0D738ZZ DILATION OF LOWER ESOPHAGUS, VIA NATURAL OR ARTIFICIAL OPENING ENDOSCOPIC: ICD-10-PCS | Performed by: STUDENT IN AN ORGANIZED HEALTH CARE EDUCATION/TRAINING PROGRAM

## 2025-06-30 PROCEDURE — 80053 COMPREHEN METABOLIC PANEL: CPT

## 2025-06-30 PROCEDURE — 0DB78ZX EXCISION OF STOMACH, PYLORUS, VIA NATURAL OR ARTIFICIAL OPENING ENDOSCOPIC, DIAGNOSTIC: ICD-10-PCS | Performed by: STUDENT IN AN ORGANIZED HEALTH CARE EDUCATION/TRAINING PROGRAM

## 2025-06-30 PROCEDURE — 2709999900 HC NON-CHARGEABLE SUPPLY: Performed by: STUDENT IN AN ORGANIZED HEALTH CARE EDUCATION/TRAINING PROGRAM

## 2025-06-30 PROCEDURE — 3700000000 HC ANESTHESIA ATTENDED CARE: Performed by: STUDENT IN AN ORGANIZED HEALTH CARE EDUCATION/TRAINING PROGRAM

## 2025-06-30 PROCEDURE — 3600007502: Performed by: STUDENT IN AN ORGANIZED HEALTH CARE EDUCATION/TRAINING PROGRAM

## 2025-06-30 PROCEDURE — 36415 COLL VENOUS BLD VENIPUNCTURE: CPT

## 2025-06-30 PROCEDURE — 88305 TISSUE EXAM BY PATHOLOGIST: CPT

## 2025-06-30 PROCEDURE — 7100000010 HC PHASE II RECOVERY - FIRST 15 MIN: Performed by: STUDENT IN AN ORGANIZED HEALTH CARE EDUCATION/TRAINING PROGRAM

## 2025-06-30 PROCEDURE — 1100000003 HC PRIVATE W/ TELEMETRY

## 2025-06-30 PROCEDURE — 2500000003 HC RX 250 WO HCPCS: Performed by: HOSPITALIST

## 2025-06-30 PROCEDURE — 2580000003 HC RX 258

## 2025-06-30 PROCEDURE — 7100000011 HC PHASE II RECOVERY - ADDTL 15 MIN: Performed by: STUDENT IN AN ORGANIZED HEALTH CARE EDUCATION/TRAINING PROGRAM

## 2025-06-30 PROCEDURE — 0DB68ZX EXCISION OF STOMACH, VIA NATURAL OR ARTIFICIAL OPENING ENDOSCOPIC, DIAGNOSTIC: ICD-10-PCS | Performed by: STUDENT IN AN ORGANIZED HEALTH CARE EDUCATION/TRAINING PROGRAM

## 2025-06-30 PROCEDURE — 6360000002 HC RX W HCPCS: Performed by: STUDENT IN AN ORGANIZED HEALTH CARE EDUCATION/TRAINING PROGRAM

## 2025-06-30 PROCEDURE — 3600007512: Performed by: STUDENT IN AN ORGANIZED HEALTH CARE EDUCATION/TRAINING PROGRAM

## 2025-06-30 PROCEDURE — 6360000002 HC RX W HCPCS

## 2025-06-30 PROCEDURE — 2500000003 HC RX 250 WO HCPCS: Performed by: STUDENT IN AN ORGANIZED HEALTH CARE EDUCATION/TRAINING PROGRAM

## 2025-06-30 PROCEDURE — 6360000002 HC RX W HCPCS: Performed by: HOSPITALIST

## 2025-06-30 RX ORDER — 0.9 % SODIUM CHLORIDE 0.9 %
INTRAVENOUS SOLUTION INTRAVENOUS
Status: DISCONTINUED | OUTPATIENT
Start: 2025-06-30 | End: 2025-06-30 | Stop reason: SDUPTHER

## 2025-06-30 RX ORDER — PHENYLEPHRINE HCL IN 0.9% NACL 0.4MG/10ML
SYRINGE (ML) INTRAVENOUS
Status: DISCONTINUED | OUTPATIENT
Start: 2025-06-30 | End: 2025-06-30 | Stop reason: SDUPTHER

## 2025-06-30 RX ORDER — SODIUM CHLORIDE 9 MG/ML
INJECTION, SOLUTION INTRAVENOUS PRN
Status: DISCONTINUED | OUTPATIENT
Start: 2025-06-30 | End: 2025-06-30 | Stop reason: HOSPADM

## 2025-06-30 RX ORDER — SODIUM CHLORIDE 0.9 % (FLUSH) 0.9 %
5-40 SYRINGE (ML) INJECTION EVERY 12 HOURS SCHEDULED
Status: DISCONTINUED | OUTPATIENT
Start: 2025-06-30 | End: 2025-06-30 | Stop reason: HOSPADM

## 2025-06-30 RX ORDER — LIDOCAINE HYDROCHLORIDE 20 MG/ML
INJECTION, SOLUTION EPIDURAL; INFILTRATION; INTRACAUDAL; PERINEURAL
Status: DISCONTINUED | OUTPATIENT
Start: 2025-06-30 | End: 2025-06-30 | Stop reason: SDUPTHER

## 2025-06-30 RX ORDER — SODIUM CHLORIDE 0.9 % (FLUSH) 0.9 %
5-40 SYRINGE (ML) INJECTION PRN
Status: DISCONTINUED | OUTPATIENT
Start: 2025-06-30 | End: 2025-06-30 | Stop reason: HOSPADM

## 2025-06-30 RX ADMIN — WATER 1000 MG: 1 INJECTION INTRAMUSCULAR; INTRAVENOUS; SUBCUTANEOUS at 20:27

## 2025-06-30 RX ADMIN — Medication 120 MCG: at 09:26

## 2025-06-30 RX ADMIN — SODIUM CHLORIDE, PRESERVATIVE FREE 10 ML: 5 INJECTION INTRAVENOUS at 20:28

## 2025-06-30 RX ADMIN — SODIUM CHLORIDE, PRESERVATIVE FREE 40 MG: 5 INJECTION INTRAVENOUS at 20:27

## 2025-06-30 RX ADMIN — MEROPENEM 1000 MG: 1 INJECTION INTRAVENOUS at 11:16

## 2025-06-30 RX ADMIN — SODIUM CHLORIDE, PRESERVATIVE FREE 40 MG: 5 INJECTION INTRAVENOUS at 07:58

## 2025-06-30 RX ADMIN — PROPOFOL 260 MG: 10 INJECTION, EMULSION INTRAVENOUS at 09:45

## 2025-06-30 RX ADMIN — LIDOCAINE HYDROCHLORIDE 100 MG: 20 INJECTION, SOLUTION EPIDURAL; INFILTRATION; INTRACAUDAL; PERINEURAL at 09:17

## 2025-06-30 RX ADMIN — SODIUM CHLORIDE 500 ML: 900 INJECTION, SOLUTION INTRAVENOUS at 09:45

## 2025-06-30 RX ADMIN — MEROPENEM 1000 MG: 1 INJECTION INTRAVENOUS at 02:39

## 2025-06-30 RX ADMIN — AZITHROMYCIN MONOHYDRATE 500 MG: 500 INJECTION, POWDER, LYOPHILIZED, FOR SOLUTION INTRAVENOUS at 00:44

## 2025-06-30 ASSESSMENT — PAIN SCALES - GENERAL
PAINLEVEL_OUTOF10: 0

## 2025-06-30 ASSESSMENT — PAIN - FUNCTIONAL ASSESSMENT: PAIN_FUNCTIONAL_ASSESSMENT: NONE - DENIES PAIN

## 2025-06-30 NOTE — ANESTHESIA POSTPROCEDURE EVALUATION
Department of Anesthesiology  Postprocedure Note    Patient: Giovanni Bojorquez  MRN: [id number]  YOB: 1955  Date of evaluation: 6/30/2025    Procedure Summary       Date: 06/30/25 Room / Location: [facility] ENDO 02 / [facility] ENDOSCOPY    Anesthesia Start: 0914 Anesthesia Stop: 0946    Procedure: ESOPHAGOGASTRODUODENOSCOPY (Upper GI Region) Diagnosis:       Hematemesis, unspecified whether nausea present      (Hematemesis, unspecified whether nausea present [K92.0])    Surgeons: Corina Chirinos MD Responsible Provider: Zenon Tilley MD    Anesthesia Type: MAC ASA Status: 3            Anesthesia Type: No value filed.    Rubi Phase I: Rubi Score: 10    Rubi Phase II: Rubi Score: 10    Anesthesia Post Evaluation    Patient location during evaluation: bedside  Patient participation: complete - patient participated  Level of consciousness: awake  Pain score: 0  Airway patency: patent  Nausea & Vomiting: no nausea and no vomiting  Cardiovascular status: hemodynamically stable  Respiratory status: acceptable  Hydration status: euvolemic  Pain management: adequate    No notable events documented.

## 2025-06-30 NOTE — WOUND CARE
Wound care nurse consult for RUE blisters, sacrum, BL buttock PI.    69 y/o male admitted 6/27/25 for Sepsis from [facility].  Patient seen on Surg tele room 3124    Past Medical History:   Diagnosis Date    DVT (deep venous thrombosis) (HCC)     Sleep apnea     no cpap     Past Surgical History:   Procedure Laterality Date    CERVICAL SPINE SURGERY  1984    LEG AMPUTATION BELOW KNEE Right     ORTHOPEDIC SURGERY Bilateral     metal rods in bilateral femurs    UPPER GASTROINTESTINAL ENDOSCOPY N/A 11/2/2023    EGD ESOPHAGOGASTRODUODENOSCOPY performed by Jagdeep Sanchez MD at [facility] ENDOSCOPY    UPPER GASTROINTESTINAL ENDOSCOPY N/A 6/30/2025    ESOPHAGOGASTRODUODENOSCOPY performed by Corina Chirinos MD at [facility] ENDOSCOPY    UROLOGICAL SURGERY      urostomy     PMHX: quadriplegic? from cervical spine injury?/surgery?, bedbound/wheelchair bound, contracted BUE and hands, Right BKA, chronic sacral wounds with osteomyelitis, resident at [facility].  Urostomy  Incontinent of stool.    Pt. S/p endoscopy this am for esophageal stricture and hematemesis.  Patient at [facility] last week - unknown reason, but states an IV infiltrated in his right arm: causing edema and serous blisters: upper arm blister all ready opened, wc nurse painted lower arm blister with betadine, poked a hole in it and drained serous fluid from it. Both wounds covered with a piece of non-adherent moist Xeroform gauze and then a small foam dressing.      Patient has chronic sacral pressure injury with osteomyelitis per chart review: Unstageable Pi with partial thickness opening. There is epithelial skin to right posterior thigh.    Right posterior upper thigh/gluteal fold.      Recommend:    RUE opened serous blisters: MEF, cleanse with wound cleanser spray and 4x4, cover each wound with a piece of xeroform gauze and then a small foam dressing date and timed.    Sacral/coccyx chronic unstageable PI: daily, cleanse with wound cleanser spray and 4x4,

## 2025-06-30 NOTE — H&P
See prior Consultation Note. The patient was reexamined. Proceed with procedure(s) (EGD) with intravenous sedation as clinically indicated

## 2025-06-30 NOTE — OP NOTE
[facility]                   Endoscopic Gastroduodenoscopy Procedure Note      6/30/2025  Giovanni Bojorquez  1955  [id number]    Procedure: Endoscopic Gastroduodenoscopy with biopsy, esophageal balloon dilation    Indication: Hematemesis     Pre-operative Diagnosis: see indication above    Post-operative Diagnosis: see findings below    : Corina Chirinos MD    Referring Provider:  No primary care provider on file.      Anesthesia/Sedation:  MAC anesthesia Propofol    Airway assessment: No airway problems anticipated    Pre-Procedural Exam:      Airway: clear, no airway problems anticipated  Heart: RRR, without gallops or rubs  Lungs: clear bilaterally without wheezes, crackles, or rhonchi  Abdomen: soft, nontender, nondistended, bowel sounds present  Mental Status: awake, alert and oriented to person, place and time       Procedure Details     After infomed consent was obtained for the procedure, with all risks and benefits of procedure explained the patient was taken to the endoscopy suite and placed in the left lateral decubitus position.  Following sequential administration of sedation as per above, the endoscope was inserted into the mouth and advanced under direct vision to second portion of the duodenum.  A careful inspection was made as the gastroscope was withdrawn, including a retroflexed view of the proximal stomach; findings and interventions are described below.      Findings:   Esophagus: the esophageal mucosa was normal in appearance in the upper and mid esophagus.  There was a benign-appearing intrinsic stricture in the distal esophagus approximately 38 cm from the incisors, not traversable with the endoscope.  A TTS balloon was inserted across the stricture and dilated [id number] with a small mucosal rent noted and still unable to pass the endoscope.  Another balloon was inserted across the stricture and dilated 10mm to 11mm with mild rent still noted

## 2025-06-30 NOTE — ANESTHESIA PRE PROCEDURE
Department of Anesthesiology  Preprocedure Note       Name:  Giovanni Bojorquez   Age:  70 y.o.  :  1955                                          MRN:  [id number]         Date:  2025      Surgeon: Surgeon(s):  Corina Chirinos MD    Procedure: Procedure(s):  ESOPHAGOGASTRODUODENOSCOPY    Medications prior to admission:   Prior to Admission medications    Medication Sig Start Date End Date Taking? Authorizing Provider   pantoprazole (PROTONIX) 40 MG tablet Take 1 tablet by mouth in the morning and at bedtime 23  Yes Mendel, David L, MD       Current medications:    Current Facility-Administered Medications   Medication Dose Route Frequency Provider Last Rate Last Admin   • 0.9 % sodium chloride infusion   IntraVENous PRN Narendra Epsinosa MD       • ondansetron (ZOFRAN) injection 4 mg  4 mg IntraVENous Q6H PRN Narendra Espinosa MD       • acetaminophen (TYLENOL) tablet 650 mg  650 mg Oral Q6H PRN Narendra Espinosa MD        Or   • acetaminophen (TYLENOL) suppository 650 mg  650 mg Rectal Q6H PRN Narendra Espinosa MD       • pantoprazole (PROTONIX) 40 mg in sodium chloride (PF) 0.9 % 10 mL injection  40 mg IntraVENous Q12H Narendra Espinosa MD   40 mg at 25   • meropenem (MERREM) 1,000 mg in sodium chloride 0.9 % 100 mL IVPB (addEASE)  1,000 mg IntraVENous Q8H Narendra Espinosa MD   Stopped at 25 0619   • sodium chloride flush 0.9 % injection 5-40 mL  5-40 mL IntraVENous 2 times per day James Bello MD   10 mL at 25   • sodium chloride flush 0.9 % injection 5-40 mL  5-40 mL IntraVENous PRN James Bello MD       • 0.9 % sodium chloride infusion   IntraVENous PRN James Bello MD           Allergies:    Allergies   Allergen Reactions   • Iodinated Contrast Media Shortness Of Breath and Myalgia     received contrast dye for CT IVP in 2017 at [facility]- Mild shortness of breath not requiring oxygen without any concern for airway, no rashes. Reported vague back spasms that

## 2025-06-30 NOTE — CARE COORDINATION
Care Management Initial Assessment       RUR: 12%  Readmission? No  1st IM letter given? Yes -   1st  letter given: No     CM confirmed w/pt & pt's sister, that pt is to d/c back to LTC at [facility].   CM sent referral to [facility] in [facility].         06/30/25 1350   Service Assessment   Patient Orientation Alert and Oriented   Cognition Alert   Primary Caregiver Other (Comment)  (SNF)   Support Systems Family Members  (sister)   PCP Verified by CM Yes   Last Visit to PCP Within last 6 months   Prior Functional Level Assistance with the following:   Current Functional Level Assistance with the following:   Can patient return to prior living arrangement Yes   Family able to assist with home care needs: No   Would you like for me to discuss the discharge plan with any other family members/significant others, and if so, who? Yes  (sister)   Financial Resources Medicare   Community Resources None   Social/Functional History   Lives With Other (Comment)   Type of Home   (LTC - [facility])   Home Equipment Wheelchair - Electric   Active  No     Bridgette Garvin  Ext [phone]

## 2025-07-01 VITALS
BODY MASS INDEX: 23.4 KG/M2 | DIASTOLIC BLOOD PRESSURE: 68 MMHG | SYSTOLIC BLOOD PRESSURE: 110 MMHG | TEMPERATURE: 98.2 F | RESPIRATION RATE: 16 BRPM | HEART RATE: 94 BPM | WEIGHT: 149.1 LBS | HEIGHT: 67 IN | OXYGEN SATURATION: 97 %

## 2025-07-01 LAB
ALBUMIN SERPL-MCNC: 2.2 G/DL (ref 3.5–5)
ALBUMIN/GLOB SERPL: 0.4 (ref 1.1–2.2)
ALP SERPL-CCNC: 93 U/L (ref 45–117)
ALT SERPL-CCNC: 49 U/L (ref 12–78)
ANION GAP SERPL CALC-SCNC: 4 MMOL/L (ref 2–12)
AST SERPL-CCNC: 40 U/L (ref 15–37)
BACTERIA SPEC CULT: ABNORMAL
BACTERIA SPEC CULT: ABNORMAL
BASOPHILS # BLD: 0.05 K/UL (ref 0–0.1)
BASOPHILS NFR BLD: 0.9 % (ref 0–1)
BILIRUB SERPL-MCNC: 0.6 MG/DL (ref 0.2–1)
BUN SERPL-MCNC: 7 MG/DL (ref 6–20)
BUN/CREAT SERPL: 11 (ref 12–20)
CALCIUM SERPL-MCNC: 8.6 MG/DL (ref 8.5–10.1)
CC UR VC: ABNORMAL
CHLORIDE SERPL-SCNC: 109 MMOL/L (ref 97–108)
CO2 SERPL-SCNC: 26 MMOL/L (ref 21–32)
CREAT SERPL-MCNC: 0.61 MG/DL (ref 0.7–1.3)
DIFFERENTIAL METHOD BLD: ABNORMAL
EOSINOPHIL # BLD: 0.74 K/UL (ref 0–0.4)
EOSINOPHIL NFR BLD: 12.6 % (ref 0–7)
ERYTHROCYTE [DISTWIDTH] IN BLOOD BY AUTOMATED COUNT: 13.6 % (ref 11.5–14.5)
FLUID CULTURE: ABNORMAL
GLOBULIN SER CALC-MCNC: 5.3 G/DL (ref 2–4)
GLUCOSE SERPL-MCNC: 85 MG/DL (ref 65–100)
HCT VFR BLD AUTO: 29.1 % (ref 36.6–50.3)
HCT VFR BLD AUTO: 29.3 % (ref 36.6–50.3)
HGB BLD-MCNC: 9.6 G/DL (ref 12.1–17)
HGB BLD-MCNC: 9.7 G/DL (ref 12.1–17)
IMM GRANULOCYTES # BLD AUTO: 0.04 K/UL (ref 0–0.04)
IMM GRANULOCYTES NFR BLD AUTO: 0.7 % (ref 0–0.5)
L PNEUMO1 AG UR QL IA: NEGATIVE
LYMPHOCYTES # BLD: 1.18 K/UL (ref 0.8–3.5)
LYMPHOCYTES NFR BLD: 20.1 % (ref 12–49)
Lab: ABNORMAL
M PNEUMO IGG SER IA-ACNC: 678 U/ML (ref 0–99)
M PNEUMO IGM SER IA-ACNC: 1670 U/ML (ref 0–769)
MCH RBC QN AUTO: 29.3 PG (ref 26–34)
MCHC RBC AUTO-ENTMCNC: 33.1 G/DL (ref 30–36.5)
MCV RBC AUTO: 88.5 FL (ref 80–99)
MONOCYTES # BLD: 0.67 K/UL (ref 0–1)
MONOCYTES NFR BLD: 11.4 % (ref 5–13)
NEUTS SEG # BLD: 3.19 K/UL (ref 1.8–8)
NEUTS SEG NFR BLD: 54.3 % (ref 32–75)
NRBC # BLD: 0 K/UL (ref 0–0.01)
NRBC BLD-RTO: 0 PER 100 WBC
ORGANISM ID: ABNORMAL
PLATELET # BLD AUTO: 272 K/UL (ref 150–400)
PMV BLD AUTO: 10.7 FL (ref 8.9–12.9)
POTASSIUM SERPL-SCNC: 3.7 MMOL/L (ref 3.5–5.1)
PROT SERPL-MCNC: 7.5 G/DL (ref 6.4–8.2)
RBC # BLD AUTO: 3.31 M/UL (ref 4.1–5.7)
S PNEUM AG SPEC QL LA: POSITIVE
SERVICE CMNT-IMP: ABNORMAL
SODIUM SERPL-SCNC: 139 MMOL/L (ref 136–145)
SPECIMEN SOURCE: ABNORMAL
SPECIMEN SOURCE: NORMAL
SPECIMEN: ABNORMAL
WBC # BLD AUTO: 5.9 K/UL (ref 4.1–11.1)

## 2025-07-01 PROCEDURE — 36415 COLL VENOUS BLD VENIPUNCTURE: CPT

## 2025-07-01 PROCEDURE — 2500000003 HC RX 250 WO HCPCS: Performed by: STUDENT IN AN ORGANIZED HEALTH CARE EDUCATION/TRAINING PROGRAM

## 2025-07-01 PROCEDURE — 85025 COMPLETE CBC W/AUTO DIFF WBC: CPT

## 2025-07-01 PROCEDURE — 85018 HEMOGLOBIN: CPT

## 2025-07-01 PROCEDURE — 80053 COMPREHEN METABOLIC PANEL: CPT

## 2025-07-01 PROCEDURE — 85014 HEMATOCRIT: CPT

## 2025-07-01 PROCEDURE — 6360000002 HC RX W HCPCS: Performed by: STUDENT IN AN ORGANIZED HEALTH CARE EDUCATION/TRAINING PROGRAM

## 2025-07-01 RX ORDER — CEFUROXIME AXETIL 500 MG/1
500 TABLET ORAL 2 TIMES DAILY
Qty: 20 TABLET | Refills: 0 | Status: SHIPPED | OUTPATIENT
Start: 2025-07-01 | End: 2025-07-11

## 2025-07-01 RX ADMIN — SODIUM CHLORIDE, PRESERVATIVE FREE 40 MG: 5 INJECTION INTRAVENOUS at 08:06

## 2025-07-01 NOTE — CARE COORDINATION
Transition of Care Plan:    RUR: 12%  Prior Level of Functioning: from [facility]  Disposition: return to [facility]    Report number # [phone] room# 136B  Please fax AVS to [phone]  REMY: 7/1/2025  If SNF or IPR: Date FOC offered:   Date FOC received:   Accepting facility:   Date authorization started with reference number:   Date authorization received and expires:   Follow up appointments:   DME needed:   Transportation at discharge: [facility] at 1700  [facility] phone [phone]    IM/IMM Medicare/ letter given: 7/1/2025  Is patient a [place] and connected with [facility]?    If yes, was  transfer form completed and [facility] notified?   Caregiver Contact: Carmen Ann  Discharge Caregiver contacted prior to discharge?   Care Conference needed?   Barriers to discharge:     Spoke with Fernanda with [facility] at [phone] they can accept patient - CM send updated medicals and MARS - request made to MD for discharge summary- Nursing will need to fax copy of AVS to facility once completed.  TASHA FINCH, MSW  [phone]

## 2025-07-01 NOTE — PROGRESS NOTES
Hospitalist Progress Note     Demographics    Patient Name  Giovanni Bojorquez   Date of Birth 1955   Medical Record Number  [id number]      Age  70 y.o.   PCP No primary care provider on file.   Admit date:  6/27/2025  6:16 PM     Room Number  3124/01  @ [facility]           Admission Diagnoses:  Sepsis (HCC)        Assessment and plan:   Sepsis with probable community acquired pneumonia vs UTI   History of chronic urostomy   - Clinically stable  -Cough seems to be slowly improving  -Continue empiric IV meropenem and Zithromax (prior history of ESBL UTI)  -Cultures pending    Hematemesis  Hx of esophageal dysphagia   S/p dilatation on 11/2023   -Seen by GI, appreciate input  -Plan for upper GI endoscopy on Monday  -No recurrence of hematemesis after admission  -Hemoglobin on the lower side, 9.4 today  -Continue IV PPI     Body mass index is 23.35 kg/m².   Reference: BMI greater than 30 is classified as obesity and greater than 40 is classified as morbid obesity.        CODE STATUS   Full   Functional Status  Limited     Surrogate decision maker:       Prophylaxis   SCD's   Discharge Plan:   , to be determined     Misc Inpatient  Payor: MEDICARE / Plan: MEDICARE PART A / Product Type: *No Product type* /   Contact VRE, ESBL (Extended Spectrum Beta Lactamase)   Query   None noted today    Prognosis   Fair    Social comments     Medical Decision Making:    Relevant labs were reviewed:  Yes    Imaging report reviewed  Yes    EKG reviewed  NA   High risk/toxic drug monitoring  NA   Care plan discussed with  Patient/Family  and Nurse      Subjective and Objective Data    Patient reports some improvement of symptoms of cough.  Dysphagia seems to be improving as well.  No report of fever overnight.    Comments       Patient Vitals for the past 24 hrs:   BP   06/29/25 0318 98/65   06/28/25 2311 121/71   06/28/25 1937 104/68   06/28/25 1500 121/75   06/28/25 1140 112/72   06/28/25 0755 114/84    
     Hospitalist Progress Note     Demographics    Patient Name  Giovanni Bojorquez   Date of Birth 1955   Medical Record Number  [id number]      Age  70 y.o.   PCP No primary care provider on file.   Admit date:  6/27/2025  6:16 PM     Room Number  3124/01  @ [facility]           Admission Diagnoses:  Sepsis (HCC)        Assessment and plan:   Sepsis with probable community acquired pneumonia   Urostomy associated UTI   History of chronic urostomy   -Improved  with no respiratory distress or fever today   -Reports occasional cough   -Urine cultures grew out Klebsiella sensitive to most antibiotics   - De-escalating antibiotics (DC meropenem and start IV ceftriaxone based on sensitivities)  - Continue Zithromax for pneumonia    Hematemesis  Hx of esophageal dysphagia   S/p dilatation on 11/2023   - Status post EGD with esophageal dilation of stricture  -Biopsies taken with pathology pending  -No recurrence of hematemesis after admission  -Hemoglobin stable at around 9.3-9.4  -Continue IV PPI     Body mass index is 23.35 kg/m².   Reference: BMI greater than 30 is classified as obesity and greater than 40 is classified as morbid obesity.        CODE STATUS   Full   Functional Status  Limited     Surrogate decision maker:       Prophylaxis   SCD's   Discharge Plan:   , to be determined     Misc Inpatient  Payor: MEDICARE / Plan: MEDICARE PART A / Product Type: *No Product type* /   Contact VRE, ESBL (Extended Spectrum Beta Lactamase)   Query   None noted today    Prognosis   Fair    Social comments     Medical Decision Making:    Relevant labs were reviewed:  Yes    Imaging report reviewed  Yes    EKG reviewed  NA   High risk/toxic drug monitoring  NA   Care plan discussed with  Patient/Family  and Nurse      Subjective and Objective Data    Patient reports some improvement of symptoms of cough.  Dysphagia seems to be improving as well.  No report of fever overnight.    Comments       Patient Vitals 
5327 GI Consult called, Dr. Sanchez on call and aware.   
CRE balloon dilatation of the esophagus   10 mm Balloon inflated to 3 ATMs and held for 10 seconds.  11 mm Balloon inflated to 5 ATMs and held for 60 seconds.      No subcutaneous crepitus of the chest or cervical region was noted post dilatation.    
CRE balloon dilatation of the esophagus   8 mm Balloon inflated to 3 ATMs and held for 15 seconds.  9 mm Balloon inflated to 5.5 ATMs and held for 60 seconds.  10 mm Balloon inflated to 9 ATMs and held for 60 seconds.    No subcutaneous crepitus of the chest or cervical region was noted post dilatation.    
DISCHARGE NOTE FROM SURGICAL-TELEMETRY NURSE    Patient determined to be stable for discharge by attending provider. I have reviewed the discharge instructions and follow-up appointments with the Patient. They verbalized understanding and all questions were answered to their satisfaction. No complaints or further questions were expressed.      Hard scripts for medications given to patient Appropriate educational materials and medication side effect teaching were provided.      Midline was removed prior to discharge.     Patient discharged with Urostomy bag.     All personal items collected during admission were returned to the patient prior to discharge.    Post-op patient: Irma Watkins RN   
End of Shift Note    Bedside shift change report given to LYNN Feliz (oncoming nurse) by Lissette Ku RN (offgoing nurse).  Report included the following information SBAR, Intake/Output, MAR, and Recent Results    Shift worked:  7pm-7am     Shift summary and any significant changes:    Patient had no complaints of pain , no nausea or vomiting during the night. Q2 turned. Had 1 BM.  NPO since midnight. For EGD today and wound care consult.    Concerns for physician to address:       Zone phone for oncoming shift:       Activity:  Level of Assistance: Dependent, patient does less than 25%  Number times ambulated in hallways past shift: 0  Number of times OOB to chair past shift: 0    Cardiac:   Cardiac Monitoring: Yes      Cardiac Rhythm: Sinus rhythm    Access:  Current line(s): midline    Genitourinary:   Urinary Status: Urostomy    Respiratory:   O2 Device: None (Room air)  Chronic home O2 use?: NO  Incentive spirometer at bedside: N/A    GI:  Last BM (including prior to admit): 06/28/25  Current diet:  Diet NPO Exceptions are: Sips of Water with Meds  Passing flatus: YES    Pain Management:   Patient states pain is manageable on current regimen: YES    Skin:  Festus Scale Score: 15  Interventions: Wound Offloading (Prevention Methods): Turning, Repositioning, Elevate heels    Patient Safety:  Fall Risk: Nursing Judgement-Fall Risk High(Add Comments): Yes  Fall Risk Interventions  Nursing Judgement-Fall Risk High(Add Comments): Yes  Toilet Every 2 Hours-In Advance of Need: Yes  Hourly Visual Checks: Awake, In bed  Fall Visual Posted: Fall sign posted, Socks  Room Door Open: Deferred to decrease stimulation  Alarm On: Bed  Patient Moved Closer to Nursing Station: No    Active Consults:   IP CONSULT TO PHARMACY  IP CONSULT TO GI  IP CONSULT TO VASCULAR ACCESS TEAM    Length of Stay:  Expected LOS: 3  Actual LOS: 2    Lissette Ku RN                            
End of Shift Note    Bedside shift change report given to LYNN Feliz (oncoming nurse) by Lissette Ku RN (offgoing nurse).  Report included the following information SBAR, Intake/Output, MAR, and Recent Results    Shift worked:  7pm-7am     Shift summary and any significant changes:    Patient had no episode of emesis during the night. Had 1 BM.   Urostomy bag intact.   For wound care consult due to multiple wounds on sacrum area. Two blisters on RUE noted. Q2 turned.   For H & H Q12 as ordered, next due at 3pm today.   Latest Hgb of 9.4 this morning.   Fluids on going and IV abx were administered. Patient has no complaint of pain, no nausea, no other complaints at this time.   Concerns for physician to address:    Zone phone for oncoming shift:       Activity:  Level of Assistance: Dependent, patient does less than 25%  Number times ambulated in hallways past shift: 0  Number of times OOB to chair past shift: 0    Cardiac:   Cardiac Monitoring: Yes      Cardiac Rhythm: Sinus rhythm    Access:  Current line(s): midline    Genitourinary:   Urinary Status: Urostomy    Respiratory:   O2 Device: None (Room air)  Chronic home O2 use?: NO  Incentive spirometer at bedside: N/A    GI:  Last BM (including prior to admit): 06/28/25  Current diet:  ADULT DIET; Regular  Diet NPO Exceptions are: Sips of Water with Meds  Passing flatus: YES    Pain Management:   Patient states pain is manageable on current regimen: YES    Skin:  Festus Scale Score: 15  Interventions: Wound Offloading (Prevention Methods): Turning, Repositioning, Pillows    Patient Safety:  Fall Risk: Nursing Judgement-Fall Risk High(Add Comments): Yes  Fall Risk Interventions  Nursing Judgement-Fall Risk High(Add Comments): Yes  Toilet Every 2 Hours-In Advance of Need: Yes  Hourly Visual Checks: Awake, In bed  Fall Visual Posted: Fall sign posted  Room Door Open: Yes  Alarm On: Bed  Patient Moved Closer to Nursing Station: No    Active Consults:   IP CONSULT 
End of Shift Note    Bedside shift change report given to LYNN Shoemaker(oncoming nurse) by Carrie Delcid RN (offgoing nurse).  Report included the following information SBAR, Kardex, MAR, and Recent Results    Shift worked:  [id number]     Shift summary and any significant changes:     Patient received in bed awake from the day shift.He had no complain due meds as per MAR given ,Q2 turn done and documented he reported no pain during the shift, Still has urostomy draining yellow urine.  Pt given chg bath,linen cleaned.    VSS. Possible discharge today to [facility].     Concerns for physician to address:       Zone phone for oncoming shift:          Activity:  Level of Assistance: Dependent, patient does less than 25%  Number times ambulated in hallways past shift: 0  Number of times OOB to chair past shift: 0    Cardiac:   Cardiac Monitoring: Yes      Cardiac Rhythm: Sinus rhythm    Access:  Current line(s): midline    Genitourinary:   Urinary Status: Urostomy    Respiratory:   O2 Device: None (Room air)  Chronic home O2 use?: NO  Incentive spirometer at bedside: YES    GI:  Last BM (including prior to admit): 06/30/25  Current diet:  ADULT DIET; Regular; GI [place] (GERD/Peptic Ulcer)  Passing flatus: YES    Pain Management:   Patient states pain is manageable on current regimen: YES    Skin:  Festus Scale Score: 14  Interventions: Wound Offloading (Prevention Methods): Turning, Repositioning    Patient Safety:  Fall Risk: Nursing Judgement-Fall Risk High(Add Comments): Yes  Fall Risk Interventions  Nursing Judgement-Fall Risk High(Add Comments): Yes  Toilet Every 2 Hours-In Advance of Need: Yes  Hourly Visual Checks: Awake, In bed  Fall Visual Posted: Armband, Fall sign posted, Socks  Room Door Open: Yes  Alarm On: Bed  Patient Moved Closer to Nursing Station: No    Active Consults:   IP CONSULT TO PHARMACY  IP CONSULT TO GI  IP CONSULT TO VASCULAR ACCESS TEAM    Length of Stay:  Expected LOS: 3  Actual LOS: 
End of Shift Note    Bedside shift change report given to Lissette (oncoming nurse) by Trini Jain RN (offgoing nurse).  Report included the following information     Shift worked:  7a-7pm     Shift summary and any significant changes:     Patient is Aox4. Patient denies any pain , N/V . Patient NPO for EGD this am. Patient remains q2 turns. Wound care consult    Concerns for physician to address:       Zone phone for oncoming shift:          Activity:  Level of Assistance: Dependent, patient does less than 25%  Number times ambulated in hallways past shift: 0  Number of times OOB to chair past shift:     Cardiac:   Cardiac Monitoring: No      Cardiac Rhythm: Sinus rhythm, Sinus tachy    Access:  Current line(s): PIV    Genitourinary:   Urinary Status: Urostomy    Respiratory:   O2 Device: None (Room air)  Chronic home O2 use?: NO  Incentive spirometer at bedside: YES    GI:  Last BM (including prior to admit): 06/29/25  Current diet:  Diet NPO Exceptions are: Sips of Water with Meds  Passing flatus: YES    Pain Management:   Patient states pain is manageable on current regimen: YES    Skin:  Festus Scale Score: 15  Interventions: Wound Offloading (Prevention Methods): Turning, Repositioning, Pillows    Patient Safety:  Fall Risk: Nursing Judgement-Fall Risk High(Add Comments): Yes  Fall Risk Interventions  Nursing Judgement-Fall Risk High(Add Comments): Yes  Toilet Every 2 Hours-In Advance of Need: Yes  Hourly Visual Checks: Awake, In bed  Fall Visual Posted: Armband, Fall sign posted, Socks  Room Door Open: Yes  Alarm On: Bed  Patient Moved Closer to Nursing Station: No    Active Consults:   IP CONSULT TO PHARMACY  IP CONSULT TO GI  IP CONSULT TO VASCULAR ACCESS TEAM    Length of Stay:  Expected LOS: 3  Actual LOS: 2    Trini Jain, RN     
End of Shift Note    Bedside shift change report given to Lissette oncoming nurse) by Trini Jain RN (offgoing nurse).  Report included the following information SBAR, Kardex, ED Summary, MAR, Accordion, and Recent Results    Shift worked:  7a-7pm     Shift summary and any significant changes:     Patient is AO x4. Patient has improved appetite. BM x1 small. Patient denies any pain.      Concerns for physician to address:       Zone phone for oncaris shift:          Activity:  Level of Assistance: Dependent, patient does less than 25%  Number times ambulated in hallways past shift: 0  Number of times OOB to chair past shift: 0    Cardiac:   Cardiac Monitoring: Yes      Cardiac Rhythm: Sinus rhythm    Access:  Current line(s): Midline    Genitourinary:   Urinary Status: Urostomy    Respiratory:   O2 Device: None (Room air)  Chronic home O2 use?: NO  Incentive spirometer at bedside: YES    GI:  Last BM (including prior to admit): 06/28/25  Current diet:  ADULT DIET; Regular  Diet NPO Exceptions are: Sips of Water with Meds  Passing flatus: YES    Pain Management:   Patient states pain is manageable on current regimen: YES    Skin:  Festus Scale Score: 13  Interventions: Wound Offloading (Prevention Methods): Elevate heels, Repositioning, Pillows, Turning    Patient Safety:  Fall Risk: Nursing Judgement-Fall Risk High(Add Comments): Yes  Fall Risk Interventions  Nursing Judgement-Fall Risk High(Add Comments): Yes  Toilet Every 2 Hours-In Advance of Need: Yes  Hourly Visual Checks: Awake, In bed  Fall Visual Posted: Fall sign posted, Socks  Room Door Open: Deferred to decrease stimulation  Alarm On: Bed  Patient Moved Closer to Nursing Station: No    Active Consults:   IP CONSULT TO PHARMACY  IP CONSULT TO GI  IP CONSULT TO VASCULAR ACCESS TEAM    Length of Stay:  Expected LOS: 3  Actual LOS: 1    Trini Jain, RN     
End of Shift Note    Bedside shift change report given to Lissette oncoming nurse) by Trini Jain RN (offgoing nurse).  Report included the following information SBAR, Kardex, MAR, Accordion, and Recent Results    Shift worked:  7a-7pm     Shift summary and any significant changes:     Patient is Aox4. Patient has weakness in all extremities and requires assistance for moving. Patient has multiple wound sites; see flowsheet. Patient has poor appetite but tolerating diet. Patient denies any pain n/v/ d. No other concerns at this time     Concerns for physician to address:       Zone phone for oncaris shift:          Activity:  Level of Assistance: Dependent, patient does less than 25%  Number times ambulated in hallways past shift: 0  Number of times OOB to chair past shift: 0    Cardiac:   Cardiac Monitoring: Yes      Cardiac Rhythm: Sinus rhythm    Access:  Current line(s): Midline    Genitourinary:   Urinary Status: Urostomy    Respiratory:   O2 Device: None (Room air)  Chronic home O2 use?: NO  Incentive spirometer at bedside: YES    GI:  Last BM (including prior to admit): 06/28/25  Current diet:  ADULT DIET; Regular  Diet NPO Exceptions are: Sips of Water with Meds  Passing flatus: NO    Pain Management:   Patient states pain is manageable on current regimen: YES    Skin:  Festus Scale Score: 14  Interventions: Wound Offloading (Prevention Methods): Turning, Repositioning, Pillows    Patient Safety:  Fall Risk: Nursing Judgement-Fall Risk High(Add Comments): Yes  Fall Risk Interventions  Nursing Judgement-Fall Risk High(Add Comments): Yes  Toilet Every 2 Hours-In Advance of Need: Yes  Hourly Visual Checks: Awake, In bed  Fall Visual Posted: Fall sign posted, Socks  Room Door Open: Yes  Alarm On: Bed  Patient Moved Closer to Nursing Station: No    Active Consults:   IP CONSULT TO PHARMACY  IP CONSULT TO GI  IP CONSULT TO VASCULAR ACCESS TEAM    Length of Stay:  Expected LOS: 3  Actual LOS: 0    Trini 
Report received from Lissette BAILEY on patient for endoscopy procedure. Will send for patient at 930 for 1030 case.     
Spiritual Health History and Assessment/Progress Note  [facility]    Initial Encounter,  ,  ,      Name: Giovanni Bojorquez MRN: [id number]    Age: 70 y.o.     Sex: male   Language: English   Muslim: Yarsani   Sepsis (HCC)     Date: 6/30/2025            Total Time Calculated: 16 min              Spiritual Assessment began in [facility] 3 SURG TELE        Referral/Consult From: Rounding   Encounter Overview/Reason: Initial Encounter  Service Provided For: Patient    Jane, Belief, Meaning:   Patient identifies as spiritual, is connected with a jane tradition or spiritual practice, and has beliefs or practices that help with coping during difficult times  Family/Friends No family/friends present      Importance and Influence:  Patient has spiritual/personal beliefs that influence decisions regarding their health  Family/Friends No family/friends present    Community:  Patient is connected with a spiritual community and feels well-supported. Support system includes: Children, [place] Community, and Extended family  Family/Friends No family/friends present    Assessment and Plan of Care:     Patient Interventions include: Facilitated expression of thoughts and feelings, Explored spiritual coping/struggle/distress, and Affirmed coping skills/support systems  Family/Friends Interventions include: No family/friends present    Patient Plan of Care: Spiritual Care available upon further referral  Family/Friends Plan of Care: Spiritual Care available upon further referral    Electronically signed by Kelsey Marquis,  Intern on 6/30/2025 at 3:28 PM   
112/72   06/28/25 0755 114/84   06/28/25 0422 125/82   06/28/25 0015 137/65   06/28/25 0000 113/71   06/27/25 2345 (!) 170/69   06/27/25 2330 125/83   06/27/25 1830 108/72   06/27/25 1829 (!) 106/59      Patient Vitals for the past 24 hrs:   Pulse   06/28/25 1500 92   06/28/25 1140 93   06/28/25 0755 92   06/28/25 0422 (!) 103   06/28/25 0130 100   06/28/25 0115 (!) 103   06/28/25 0100 96   06/28/25 0045 99   06/28/25 0044 99   06/28/25 0030 100   06/28/25 0015 (!) 102   06/28/25 0000 (!) 104   06/27/25 2345 99   06/27/25 2330 92   06/27/25 1830 (!) 108      Patient Vitals for the past 24 hrs:   Resp   06/28/25 1140 18   06/28/25 0755 16   06/28/25 0422 18   06/28/25 0130 16   06/28/25 0115 21   06/28/25 0100 18   06/28/25 0045 15   06/28/25 0044 15   06/28/25 0030 17   06/28/25 0015 20   06/28/25 0000 13   06/27/25 2345 19   06/27/25 2330 15   06/27/25 1830 13      Patient Vitals for the past 24 hrs:   Temp   06/28/25 1140 98.6 °F (37 °C)   06/28/25 0755 98.1 °F (36.7 °C)   06/28/25 0422 99.3 °F (37.4 °C)   06/27/25 1830 (!) 100.6 °F (38.1 °C)        SpO2 Readings from Last 6 Encounters:   06/28/25 96%   11/07/23 95%   10/28/23 100%            Body mass index is 23.35 kg/m².   Wt Readings from Last 10 Encounters:   06/28/25 67.6 kg (149 lb 1.6 oz)   11/01/23 50.7 kg (111 lb 12.4 oz)   10/28/23 50.7 kg (111 lb 12.4 oz)      I/O last 3 completed shifts:  In: 500 [P.O.:500]  Out: 1150 [Urine:1150]  I/O this shift:  In: 240 [P.O.:240]  Out: 60 [Urine:60]        Physical Exam:         Physical Exam  Vitals and nursing note reviewed.   Constitutional:       Appearance: Normal appearance.   HENT:      Head: Normocephalic and atraumatic.   Cardiovascular:      Rate and Rhythm: Tachycardia present.   Pulmonary:      Effort: Pulmonary effort is normal.      Breath sounds: Rhonchi present.   Abdominal:      General: Abdomen is flat. Bowel sounds are normal.      Palpations: Abdomen is soft.   Musculoskeletal:      Cervical 
bedside: N/A    GI:  Last BM (including prior to admit): 06/28/25  Current diet:  ADULT DIET; Regular  Passing flatus: YES    Pain Management:   Patient states pain is manageable on current regimen: N/A    Skin:  Festus Scale Score: 14  Interventions: Wound Offloading (Prevention Methods): Bed, pressure reduction mattress, Elevate heels, Pillows, Repositioning, Turning    Patient Safety:  Fall Risk: Nursing Judgement-Fall Risk High(Add Comments): Yes  Fall Risk Interventions  Nursing Judgement-Fall Risk High(Add Comments): Yes  Toilet Every 2 Hours-In Advance of Need: No (Comment)  Hourly Visual Checks: In bed, Eyes closed, Quiet  Fall Visual Posted: Fall sign posted, Socks  Room Door Open: Yes  Alarm On: Bed  Patient Moved Closer to Nursing Station: No    Active Consults:   IP CONSULT TO PHARMACY  IP CONSULT TO GI    Length of Stay:  Expected LOS: 3  Actual LOS: 0    Ann Martino RN

## 2025-07-02 NOTE — DISCHARGE SUMMARY
Physician Discharge Summary     Pt Name  Giovanni Bojorquez   Admit date:  6/27/2025   Discharge date and time:  7/1/2025    Room Number  3124/01    [facility]    Medical Record Number  [id number]    Age  70 y.o.   Date of Birth 1955   PCP No primary care provider on file.   Admission Diagnoses: Sepsis (HCC)   Present on Admission:   Sepsis (HCC)     Allergies   Allergen Reactions    Iodinated Contrast Media Shortness Of Breath and Myalgia     received contrast dye for CT IVP in October 2017 at OSH- Mild shortness of breath not requiring oxygen without any concern for airway, no rashes. Reported vague back spasms that quickly resolved. Please see care everywhere for more information    Amlodipine Swelling     Seems to have contributed to significant LE edema (improved in 2/2019 after stopping Amlodipine)    Contrast [Barium-Containing Compounds]     Sulfa Antibiotics Nausea And Vomiting         Excerpt from HPI :   \"Giovanni Bojorquez is a 70 y.o.  male with PMHx significant for  has a past medical history of DVT (deep venous thrombosis) (HCC) and Sleep apnea. who presents with the above chief complaint.      We were asked to admit for work up and further evaluation.      Patient here with chief complaint of hematemesis. Reports has not been feeling well for the past 1 week or so. Notes that he has been vomiting for at least the last 1 week. Reports that his vomit has been very dark and has generally looked like coffee. No diarrhea, no constipation. Has not noticed any blood in his stool as far as he knows. Does note [facility] unable to tell me what [facility] for. Brought here today from [facility]. \"     Hospital Course:   Patient was hospitalized and closely monitored on telemetry after he initially presented with episodes of hematemesis with dysphagia.  There was also concern about pneumonia and urostomy associated with UTI.  He received

## 2025-07-03 LAB
BACTERIA SPEC CULT: NORMAL
BACTERIA SPEC CULT: NORMAL
SERVICE CMNT-IMP: NORMAL
SERVICE CMNT-IMP: NORMAL

## (undated) DEVICE — SET GRAV CK VLV NEEDLESS ST 3 GANGED 4WAY STPCOCK HI FLO 10

## (undated) DEVICE — CATHETER IV 20GA L1.16IN OD1.0414-1.1176MM ID0.762-0.8382MM

## (undated) DEVICE — FORCEP BX LG CAP 2.4 MMX120 CM W/ NDL YEL RADIAL JAW 4 DISP

## (undated) DEVICE — ESOPHAGEAL BALLOON DILATATION CATHETER: Brand: CRE FIXED WIRE

## (undated) DEVICE — IV START KIT: Brand: MEDLINE

## (undated) DEVICE — Device

## (undated) DEVICE — DEVICE INFL 60ML 15ATM DISPOSABLE STERIFLATE CRE

## (undated) DEVICE — CATHETER IV 18GA L1.16IN OD1.27-1.3462MM ID0.9398-1.016MM

## (undated) DEVICE — CONTAINER SPEC 20 ML LID NEUT BUFF FORMALIN 10 % POLYPR STS

## (undated) DEVICE — COVIDIEN KENDALL DL DISPOSABLE 3 LEAD SY: Brand: MEDLINE RENEWAL

## (undated) DEVICE — SYRINGE INFL 60ML DISP ALLIANCE II

## (undated) DEVICE — CATHETER IV 24GA L0.75IN OD0.6604-0.7366MM

## (undated) DEVICE — CATHETER IV 22GA L1IN OD0.8382-0.9144MM ID0.6096-0.6858MM 382523